# Patient Record
Sex: FEMALE | Race: WHITE | Employment: FULL TIME | URBAN - NONMETROPOLITAN AREA
[De-identification: names, ages, dates, MRNs, and addresses within clinical notes are randomized per-mention and may not be internally consistent; named-entity substitution may affect disease eponyms.]

---

## 2020-08-25 VITALS — HEIGHT: 65 IN

## 2020-08-25 PROBLEM — Z80.41 FAMILY HISTORY OF OVARIAN CANCER: Status: ACTIVE | Noted: 2020-08-25

## 2020-08-25 PROBLEM — N85.01 ENDOMETRIAL HYPERPLASIA, SIMPLE: Status: ACTIVE | Noted: 2020-08-25

## 2020-08-25 SDOH — HEALTH STABILITY: MENTAL HEALTH: HOW OFTEN DO YOU HAVE A DRINK CONTAINING ALCOHOL?: NEVER

## 2020-09-09 ENCOUNTER — OFFICE VISIT (OUTPATIENT)
Dept: OBGYN CLINIC | Age: 45
End: 2020-09-09
Payer: COMMERCIAL

## 2020-09-09 ENCOUNTER — HOSPITAL ENCOUNTER (OUTPATIENT)
Age: 45
Setting detail: SPECIMEN
Discharge: HOME | End: 2020-09-09
Payer: COMMERCIAL

## 2020-09-09 VITALS
SYSTOLIC BLOOD PRESSURE: 131 MMHG | DIASTOLIC BLOOD PRESSURE: 94 MMHG | BODY MASS INDEX: 48.82 KG/M2 | HEIGHT: 65 IN | WEIGHT: 293 LBS

## 2020-09-09 LAB
ESTIMATED AVERAGE GLUCOSE: 217 MG/DL
HBA1C MFR BLD: 9.2 % (ref 4–6)
TSH SERPL DL<=0.05 MIU/L-ACNC: 2.16 MIU/L (ref 0.3–5)

## 2020-09-09 PROCEDURE — 99396 PREV VISIT EST AGE 40-64: CPT | Performed by: NURSE PRACTITIONER

## 2020-09-09 RX ORDER — MONTELUKAST SODIUM 10 MG/1
TABLET ORAL
COMMUNITY
Start: 2019-12-02

## 2020-09-09 RX ORDER — LISINOPRIL 2.5 MG/1
TABLET ORAL
COMMUNITY
Start: 2020-08-23 | End: 2022-01-03 | Stop reason: ALTCHOICE

## 2020-09-09 NOTE — PROGRESS NOTES
YEARLY PHYSICAL    Date of service: 2020    Radha Mahajan  Is a 54 y.o.   female    PT's PCP is: Yasmin Fleming MD     : 1975                                             Subjective:       Patient's last menstrual period was 2020 (within days). Are your menses regular: no    OB History    Para Term  AB Living   0 0 0 0 0 0   SAB TAB Ectopic Molar Multiple Live Births   0 0 0 0 0 0        Social History     Tobacco Use   Smoking Status Never Smoker   Smokeless Tobacco Never Used        Social History     Substance and Sexual Activity   Alcohol Use Never    Frequency: Never       Family History   Problem Relation Age of Onset    Diabetes Father     Heart Disease Father     Cancer Sister 35        ovarian       Any family history of breast or ovarian cancer:  Yes    Any family history of blood clots: No      Allergies: Statins      Current Outpatient Medications:     progesterone (PROMETRIUM) 200 MG capsule, Take 1 capsule by mouth nightly As directed, Disp: 30 capsule, Rfl: 6    linagliptin (TRADJENTA) 5 MG tablet, TAKE ONE TABLET BY MOUTH DAILY, Disp: , Rfl:     lisinopril (PRINIVIL;ZESTRIL) 2.5 MG tablet, , Disp: , Rfl:     metFORMIN (GLUCOPHAGE) 1000 MG tablet, , Disp: , Rfl:     montelukast (SINGULAIR) 10 MG tablet, TAKE ONE TABLET BY MOUTH DAILY, Disp: , Rfl:     Ferrous Gluconate (IRON 27 PO), Take by mouth, Disp: , Rfl:     DAILY MULTIPLE VITAMINS/IRON PO, Take by mouth, Disp: , Rfl:     Omeprazole (PRILOSEC PO), Take by mouth, Disp: , Rfl:     Social History     Substance and Sexual Activity   Sexual Activity Yes    Partners: Male       Any bleeding or pain with intercourse: No    Last Yearly: 2019    Last pap: 2018    Last HPV: 2018    Last Mammogram: 2018      Past Medical History:   Diagnosis Date    Diabetes mellitus ([facility] [address]     Endometrial hyperplasia, simple     Hypertension     Infertility, female     Insulin resistance        Past Surgical History:   Procedure Laterality Date    DILATION AND CURETTAGE  2006    HYSTEROSCOPY  2006    KNEE SURGERY Right 1993    TONSILLECTOMY  1979       Family History   Problem Relation Age of Onset    Diabetes Father     Heart Disease Father     Cancer Sister 35        ovarian       Chief Complaint   Patient presents with    Gynecologic Exam     Not due for pap. Refill progesterone. Feeling well, voices no concerns. Due for mammogram.          PE:  Vital Signs  Blood pressure (!) 131/94, height 5' 5\" (1.651 m), weight (!) 312 lb 3.2 oz (141.6 kg), last menstrual period 06/29/2020, not currently breastfeeding. Labs:    No results found for this visit on 09/09/20. HPI: Patient here for annual exam. Feeling well voices no concerns. Denies breast/pelvic pain. Patient continues to take Prometrium cyclically. Cycles have been irregular over last year. Having menses every 3 months. Reports last Hgb A1c was drawn about a year ago and was  7.6. Review of Systems   Genitourinary: Positive for menstrual problem (every 3 months for last year ). All other systems reviewed and are negative. Objective  Lymphatic:   no lymphadenopathy  Heent:   negative   Cor: regular rate and rhythm, no murmurs              Pul:clear to auscultation bilaterally- no wheezes, rales or rhonchi, normal air movement, no respiratory distress      GI: Abdomen soft, non-tender. BS normal. No masses,  No organomegaly           Extremities: normal strength, tone, and muscle mass, ROM of all joints is normal   Breasts: Breast:normal appearance, no masses or tenderness, No nipple retraction or dimpling, No nipple discharge or bleeding   Pelvic Exam: GENITAL/URINARY:  External Genitalia:  General appearance; normal, Hair distribution; normal, Lesions absent  Urethral Meatus:  Size normal, Location normal, Lesions absent, Prolapse absent  Urethra:   Fullness absent, Masses absent  Bladder:  Fullness absent, Masses absent, Tenderness absent, Cystocele absent  Vagina:  General appearance normal, Estrogen effect normal, Discharge absent, Lesions absent, Pelvic support normal  Cervix:  General appearance normal, Lesions absent, Discharge absent, Tenderness absent, Enlargement absent, Nodularity absent  Uterus:  Size normal, Tenderness absent  Adenexa: Masses absent, Tenderness absent  Anus/Perineum:  Lesions absent and Masses absent                                    Vaginal discharge: no vaginal discharge                            Assessment and Plan          Diagnosis Orders   1. Encounter for screening mammogram for malignant neoplasm of breast  TIAGO DIGITAL SCREEN W OR WO CAD BILATERAL   2. Women's annual routine gynecological examination  TSH With Reflex Ft4    Hemoglobin A1C   3. Endometrial hyperplasia, unspecified  progesterone (PROMETRIUM) 200 MG capsule             I have discontinued Radha Mahajan's Montelukast Sodium (SINGULAIR PO), linaGLIPtin (TRADJENTA PO), Progesterone Micronized (PROMETRIUM PO), LISINOPRIL PO, and METFORMIN HCL PO. I am also having her start on progesterone. Additionally, I am having her maintain her Ferrous Gluconate (IRON 27 PO), DAILY MULTIPLE VITAMINS/IRON PO, Omeprazole (PRILOSEC PO), linagliptin, lisinopril, metFORMIN, and montelukast.    Return in about 1 week (around 9/16/2020) for gyn US w/fu, hx of endometrial hyperplasia, AUB. She was also counseled on her preventative health maintenance recommendations and follow-up. There are no Patient Instructions on file for this visit.     Lorena Colbert,9/13/2020 6:28 PM

## 2020-10-05 ENCOUNTER — OFFICE VISIT (OUTPATIENT)
Dept: OBGYN CLINIC | Age: 45
End: 2020-10-05
Payer: COMMERCIAL

## 2020-10-05 VITALS — WEIGHT: 293 LBS | SYSTOLIC BLOOD PRESSURE: 130 MMHG | BODY MASS INDEX: 51.29 KG/M2 | DIASTOLIC BLOOD PRESSURE: 92 MMHG

## 2020-10-05 PROCEDURE — 99213 OFFICE O/P EST LOW 20 MIN: CPT | Performed by: NURSE PRACTITIONER

## 2020-10-05 NOTE — PROGRESS NOTES
PROBLEM VISIT     Date of service: 10/5/2020    Radha Mahajan  Is a 54 y.o. female    PT's PCP is: Tu Abernathy MD     : 1975                                             Subjective:       Patient's last menstrual period was 2020. OB History    Para Term  AB Living   0 0 0 0 0 0   SAB TAB Ectopic Molar Multiple Live Births   0 0 0 0 0 0        Social History     Tobacco Use   Smoking Status Never Smoker   Smokeless Tobacco Never Used        Social History     Substance and Sexual Activity   Alcohol Use Never    Frequency: Never       Allergies: Statins      Current Outpatient Medications:     progesterone (PROMETRIUM) 200 MG capsule, Take 1 capsule by mouth nightly As directed, Disp: 30 capsule, Rfl: 6    linagliptin (TRADJENTA) 5 MG tablet, TAKE ONE TABLET BY MOUTH DAILY, Disp: , Rfl:     lisinopril (PRINIVIL;ZESTRIL) 2.5 MG tablet, , Disp: , Rfl:     metFORMIN (GLUCOPHAGE) 1000 MG tablet, , Disp: , Rfl:     montelukast (SINGULAIR) 10 MG tablet, TAKE ONE TABLET BY MOUTH DAILY, Disp: , Rfl:     Ferrous Gluconate (IRON 27 PO), Take by mouth, Disp: , Rfl:     DAILY MULTIPLE VITAMINS/IRON PO, Take by mouth, Disp: , Rfl:     Omeprazole (PRILOSEC PO), Take by mouth, Disp: , Rfl:     Social History     Substance and Sexual Activity   Sexual Activity Yes    Partners: Male       Chief Complaint   Patient presents with    Follow-up     Follow up usn for irregular menses. PE:  Vital Signs  Blood pressure (!) 130/92, weight (!) 308 lb 3.2 oz (139.8 kg), last menstrual period 2020, not currently breastfeeding. HPI: Patient presents today following gyn usn. Patient has a history of simple hyperplasia in 2006. Has been progesterone cyclically since. Reports menses q3 months for the past year. PT denies fever, chills, nausea and vomiting       Review of Systems   Constitutional: Negative. Genitourinary: Positive for menstrual problem.        Physical Exam  Constitutional:       Appearance: Normal appearance. HENT:      Head: Normocephalic. Pulmonary:      Effort: Pulmonary effort is normal.   Musculoskeletal: Normal range of motion. Neurological:      Mental Status: She is alert and oriented to person, place, and time. Psychiatric:         Behavior: Behavior normal.         Assessment and Plan          Diagnosis Orders   1. Endometrial hyperplasia, unspecified         Labs and usn reviewed with patient. Hgb A1C significantly elevated (10.2). Patient has started dieting and exercising, reports loss of 3 pounds. Has appt to follow up with PCP who is managing diabetes. USN revealed thin endometrial lining 3.4mm. But due to personal history and multiple risk factors would encourage further evaluation of endometrial lining due to cycle changes. I am having Geanie Mealing. Boroff maintain her Ferrous Gluconate (IRON 27 PO), DAILY MULTIPLE VITAMINS/IRON PO, Omeprazole (PRILOSEC PO), linagliptin, lisinopril, metFORMIN, montelukast, and progesterone. No follow-ups on file. There are no Patient Instructions on file for this visit. Over 50% of time spent on counseling and care coordination on: see assessment and plan,  She was also counseled on her preventative health maintenance recommendations and follow-up.         FF time: 15 min      Dennis Colbert,10/9/2020 10:14 AM

## 2020-10-08 ENCOUNTER — TELEPHONE (OUTPATIENT)
Dept: OBGYN CLINIC | Age: 45
End: 2020-10-08

## 2020-10-08 NOTE — TELEPHONE ENCOUNTER
Please call patient and notify her that I discussed case with Dr. Shila Melton and endometrial biopsy would be recommended at this time due to history of simple hyperplasia.  Thanks

## 2020-10-08 NOTE — TELEPHONE ENCOUNTER
Pt aware of recommendation but states it is very painful for her and says she will need something to take before appt to help relax her or she won;t be able to do the procedure in the office. We did not schedule endo bx yet, shewanted an answer on meds before we schedule.

## 2020-11-04 RX ORDER — ALPRAZOLAM 0.5 MG/1
0.5 TABLET ORAL ONCE
Qty: 1 TABLET | Refills: 0 | Status: SHIPPED | OUTPATIENT
Start: 2020-11-04 | End: 2020-11-04

## 2020-11-04 NOTE — TELEPHONE ENCOUNTER
Spoke to patient, she wanted to go ahead and schedule her endometrial biopsy. She is aware that Genn[facility] gave the ok to send in a Rx for Xanax. Rx called to [facility] on [address] [employer] [facility] and appt scheduled. Patient verbalized understanding, no further questions/concerns voiced.

## 2020-11-24 ENCOUNTER — HOSPITAL ENCOUNTER (OUTPATIENT)
Age: 45
Setting detail: SPECIMEN
Discharge: HOME | End: 2020-11-24
Payer: COMMERCIAL

## 2020-11-24 ENCOUNTER — PROCEDURE VISIT (OUTPATIENT)
Dept: OBGYN CLINIC | Age: 45
End: 2020-11-24
Payer: COMMERCIAL

## 2020-11-24 VITALS — DIASTOLIC BLOOD PRESSURE: 86 MMHG | SYSTOLIC BLOOD PRESSURE: 140 MMHG | WEIGHT: 293 LBS | BODY MASS INDEX: 51.42 KG/M2

## 2020-11-24 LAB
CONTROL: NORMAL
ESTRADIOL LEVEL: 40 PG/ML (ref 27–314)
FOLLICLE STIMULATING HORMONE: 11.9 U/L (ref 1.7–21.5)
PREGNANCY TEST URINE, POC: NEGATIVE

## 2020-11-24 PROCEDURE — 58100 BIOPSY OF UTERUS LINING: CPT | Performed by: NURSE PRACTITIONER

## 2020-11-24 PROCEDURE — 81025 URINE PREGNANCY TEST: CPT | Performed by: NURSE PRACTITIONER

## 2020-11-24 NOTE — PROGRESS NOTES
Radha Mahajan, A 54y.o. year old female presents to the office for an Endometrial Biopsy. BP (!) 140/86   Wt (!) 309 lb (140.2 kg)   BMI 51.42 kg/m²     Reason For Biopsy: hx of simple hyperplasia, irregular menses. EMB: performed today - procedure explained to pt - verbal consent obtained     Patient did not have serum hcg drawn prior, but was agreeable to urine pregnancy test today. Negative results. Procedure:      A speculum was paced in vaginal canal and the cervix was visualized. The cervix was then prepped with betadine x 3. A tenaculum was used. The uterus was measured to be 7 cm. Pipette used to obtain specimen and the specimen will be sent for pathology. A small amount of tissue was collected. Pt tolerated the procedure well. Assessment:    Patient with history of simple hyperplasia in 2006. Was having monthly menses until past year where they started occuring every 3 months. No menses since 6/29/2020. Discussed labs to evaluate perimenopause. Hgb A1C significantly elevated (9.2), which could potentially be attributing to amenorrhea state also. Case reviewed with Dr. Georgiana Krishnamurthy. Plan:   1. Warning signs reviewed such as cramping and spotting. 2. Encouraged to use ibuprofen 800 mg TID PRN and heat for comfort. 3. Will call patient with biopsy results.    4. Anticipate follow up with: potential adjustment of treatment plan pending usn results

## 2020-11-27 LAB — SURGICAL PATHOLOGY REPORT: NORMAL

## 2020-12-01 NOTE — RESULT ENCOUNTER NOTE
Normal labs and endo bx sample was negative for atypia or malignancy. Continue with prometrium cyclically.

## 2021-09-13 ENCOUNTER — HOSPITAL ENCOUNTER (OUTPATIENT)
Age: 46
Setting detail: SPECIMEN
Discharge: HOME | End: 2021-09-13
Payer: COMMERCIAL

## 2021-09-13 ENCOUNTER — OFFICE VISIT (OUTPATIENT)
Dept: OBGYN CLINIC | Age: 46
End: 2021-09-13
Payer: COMMERCIAL

## 2021-09-13 VITALS — SYSTOLIC BLOOD PRESSURE: 130 MMHG | WEIGHT: 293 LBS | BODY MASS INDEX: 49.36 KG/M2 | DIASTOLIC BLOOD PRESSURE: 84 MMHG

## 2021-09-13 DIAGNOSIS — Z01.419 WOMEN'S ANNUAL ROUTINE GYNECOLOGICAL EXAMINATION: Primary | ICD-10-CM

## 2021-09-13 DIAGNOSIS — Z87.42 HISTORY OF ENDOMETRIAL HYPERPLASIA: ICD-10-CM

## 2021-09-13 PROCEDURE — 99396 PREV VISIT EST AGE 40-64: CPT | Performed by: NURSE PRACTITIONER

## 2021-09-13 RX ORDER — CHOLECALCIFEROL (VITAMIN D3) 1250 MCG
CAPSULE ORAL
COMMUNITY
Start: 2021-06-29

## 2021-09-13 RX ORDER — EZETIMIBE 10 MG/1
TABLET ORAL
COMMUNITY
Start: 2021-08-30

## 2021-09-13 RX ORDER — DAPAGLIFLOZIN 5 MG/1
TABLET, FILM COATED ORAL
COMMUNITY
Start: 2021-08-31

## 2021-09-13 RX ORDER — PROGESTERONE 200 MG/1
200 CAPSULE ORAL NIGHTLY
Qty: 30 CAPSULE | Refills: 6 | Status: SHIPPED | OUTPATIENT
Start: 2021-09-13 | End: 2022-01-03 | Stop reason: ALTCHOICE

## 2021-09-13 NOTE — PROGRESS NOTES
YEARLY PHYSICAL    Date of service: 2021    Radha Mahajan  Is a 51 y.o.  female    PT's PCP is: Tu Abernathy MD     : 1975                                             Subjective:       Patient's last menstrual period was 06/15/2021. Are your menses regular: no    OB History    Para Term  AB Living   0 0 0 0 0 0   SAB TAB Ectopic Molar Multiple Live Births   0 0 0 0 0 0        Social History     Tobacco Use   Smoking Status Never Smoker   Smokeless Tobacco Never Used        Social History     Substance and Sexual Activity   Alcohol Use Never       Family History   Problem Relation Age of Onset    Diabetes Father     Heart Disease Father     Cancer Sister 35        ovarian       Any family history of breast or ovarian cancer:  Yes    Any family history of blood clots: No    Allergies: Statins      Current Outpatient Medications:     ezetimibe (ZETIA) 10 MG tablet, , Disp: , Rfl:     FARXIGA 5 MG tablet, , Disp: , Rfl:     Cholecalciferol (VITAMIN D3) 1.25 MG (58193 UT) CAPS, , Disp: , Rfl:     SITagliptin (JANUVIA) 100 MG tablet, Take 100 mg by mouth daily, Disp: , Rfl:     progesterone (PROMETRIUM) 200 MG CAPS capsule, Take 1 capsule by mouth nightly, Disp: 30 capsule, Rfl: 6    progesterone (PROMETRIUM) 200 MG capsule, Take 1 capsule by mouth nightly As directed, Disp: 30 capsule, Rfl: 6    lisinopril (PRINIVIL;ZESTRIL) 2.5 MG tablet, , Disp: , Rfl:     metFORMIN (GLUCOPHAGE) 1000 MG tablet, , Disp: , Rfl:     montelukast (SINGULAIR) 10 MG tablet, TAKE ONE TABLET BY MOUTH DAILY, Disp: , Rfl:     Ferrous Gluconate (IRON 27 PO), Take by mouth, Disp: , Rfl:     DAILY MULTIPLE VITAMINS/IRON PO, Take by mouth, Disp: , Rfl:     Omeprazole (PRILOSEC PO), Take by mouth, Disp: , Rfl:     Social History     Substance and Sexual Activity   Sexual Activity Yes    Partners: Male       Any bleeding or pain with intercourse: No    Last Yearly:  9/2020    Last pap: 7/2018    Last HPV: 7/2018    Last Mammogram: 6/2021    Last Dexascan n/a    Last colorectal screen- 2016    Do you do self breast exams: encouraged monthly SBE    Past Medical History:   Diagnosis Date    Diabetes mellitus ([address]     Endometrial hyperplasia, simple     Hypertension     Infertility, female     Insulin resistance        Past Surgical History:   Procedure Laterality Date    DILATION AND CURETTAGE  2006    HYSTEROSCOPY  2006    KNEE SURGERY Right 1993    TONSILLECTOMY  1979       Family History   Problem Relation Age of Onset    Diabetes Father     Heart Disease Father     Cancer Sister 35        ovarian       Chief Complaint   Patient presents with    Gynecologic Exam     Last pap 7/10/18. Mammogram done 6/2021. Refill prometrium     Menstrual Problem     Reports menses becoming more spaced out. PE:  Vital Signs  Blood pressure 130/84, weight 296 lb 9.6 oz (134.5 kg), last menstrual period 06/15/2021, not currently breastfeeding. Estimated body mass index is 49.36 kg/m² as calculated from the following:    Height as of 9/9/20: 5' 5\" (1.651 m). Weight as of this encounter: 296 lb 9.6 oz (134.5 kg). HPI: Patient presents today for annual exam. Denies breast/pelvic pain. Due for pap. Mammogram and wellness UTD. Recent hgb A1C in the 8's. Menses continue to be irregular. States she will have menses monthly and then skip several months. Continues to take Prometrium 749IP cyclically for 10 nights. Review of Systems   Genitourinary: Positive for menstrual problem. All other systems reviewed and are negative. Objective  Heent:   negative   Cor: regular rate and rhythm, no murmurs              Pul:clear to auscultation bilaterally- no wheezes, rales or rhonchi, normal air movement, no respiratory distress      GI: Abdomen soft, non-tender.  BS normal. No masses,  No organomegaly           Extremities: normal strength, tone, and muscle mass, ROM of all joints is normal   Breasts: Breast:normal appearance, no masses or tenderness, No nipple retraction or dimpling, No nipple discharge or bleeding   Pelvic Exam: GENITAL/URINARY:  External Genitalia:  General appearance; normal, Hair distribution; normal, Lesions absent  Urethral Meatus:  Size normal, Location normal, Lesions absent, Prolapse absent  Urethra: Fullness absent, Masses absent  Bladder:  Fullness absent, Masses absent, Tenderness absent, Cystocele absent  Vagina:  General appearance normal, Estrogen effect normal, Discharge absent, Lesions absent, Pelvic support normal  Cervix:  General appearance normal, Lesions absent, Discharge absent, Tenderness absent, Enlargement absent, Nodularity absent  Uterus:  Size normal, Tenderness absent  Adenexa: Masses absent, Tenderness absent, difficult to assess due to body habitus   Anus/Perineum:  Lesions absent and Masses absent                                    Vaginal discharge: no vaginal discharge   Chaperone: not present                      Assessment and Plan          Diagnosis Orders   1. Women's annual routine gynecological examination     2. History of endometrial hyperplasia  progesterone (PROMETRIUM) 200 MG CAPS capsule       will plan for ultrasound to assess endometrium, discussed possible need for bx. Patient considering [facility] for definitive management    I have discontinued Radha Mahajan's linagliptin. I am also having her start on progesterone. Additionally, I am having her maintain her Ferrous Gluconate (IRON 27 PO), DAILY MULTIPLE VITAMINS/IRON PO, Omeprazole (PRILOSEC PO), lisinopril, metFORMIN, montelukast, progesterone, ezetimibe, Farxiga, Vitamin D3, and SITagliptin. Return for gyn US, irregular menses, hx of hyperplasia . She was also counseled on her preventative health maintenance recommendations and follow-up. There are no Patient Instructions on file for this visit.     JING Nuno - NP,9/13/2021 10:43 AM

## 2021-09-16 LAB
HPV SAMPLE: NORMAL
HPV, GENOTYPE 16: NOT DETECTED
HPV, GENOTYPE 18: NOT DETECTED
HPV, HIGH RISK OTHER: NOT DETECTED
HPV, INTERPRETATION: NORMAL
SPECIMEN DESCRIPTION: NORMAL

## 2021-09-17 LAB — CYTOLOGY REPORT: NORMAL

## 2021-09-27 ENCOUNTER — TELEPHONE (OUTPATIENT)
Dept: OBGYN CLINIC | Age: 46
End: 2021-09-27

## 2021-09-27 ENCOUNTER — OFFICE VISIT (OUTPATIENT)
Dept: OBGYN CLINIC | Age: 46
End: 2021-09-27
Payer: COMMERCIAL

## 2021-09-27 VITALS — BODY MASS INDEX: 48.51 KG/M2 | SYSTOLIC BLOOD PRESSURE: 125 MMHG | WEIGHT: 291.5 LBS | DIASTOLIC BLOOD PRESSURE: 86 MMHG

## 2021-09-27 DIAGNOSIS — N92.6 IRREGULAR MENSES: ICD-10-CM

## 2021-09-27 DIAGNOSIS — N83.202 LEFT OVARIAN CYST: ICD-10-CM

## 2021-09-27 DIAGNOSIS — Z87.42 HISTORY OF ENDOMETRIAL HYPERPLASIA: Primary | ICD-10-CM

## 2021-09-27 PROCEDURE — G8417 CALC BMI ABV UP PARAM F/U: HCPCS | Performed by: NURSE PRACTITIONER

## 2021-09-27 PROCEDURE — G8427 DOCREV CUR MEDS BY ELIG CLIN: HCPCS | Performed by: NURSE PRACTITIONER

## 2021-09-27 PROCEDURE — 99213 OFFICE O/P EST LOW 20 MIN: CPT | Performed by: NURSE PRACTITIONER

## 2021-09-27 PROCEDURE — 1036F TOBACCO NON-USER: CPT | Performed by: NURSE PRACTITIONER

## 2021-09-27 RX ORDER — OMEPRAZOLE 20 MG/1
CAPSULE, DELAYED RELEASE ORAL
COMMUNITY
Start: 2021-09-17

## 2021-09-27 ASSESSMENT — ENCOUNTER SYMPTOMS
GASTROINTESTINAL NEGATIVE: 1
RESPIRATORY NEGATIVE: 1

## 2021-09-27 NOTE — PROGRESS NOTES
PROBLEM VISIT     Date of service: 2021    Radha Mahajan  Is a 51 y.o. female    PT's PCP is: Matilda Lewis MD     : 1975                                             Subjective:       No LMP recorded. (Menstrual status: Irregular periods). OB History    Para Term  AB Living   0 0 0 0 0 0   SAB TAB Ectopic Molar Multiple Live Births   0 0 0 0 0 0        Social History     Tobacco Use   Smoking Status Never Smoker   Smokeless Tobacco Never Used        Social History     Substance and Sexual Activity   Alcohol Use Never       Allergies: Statins      Current Outpatient Medications:     omeprazole (PRILOSEC) 20 MG delayed release capsule, , Disp: , Rfl:     ezetimibe (ZETIA) 10 MG tablet, , Disp: , Rfl:     FARXIGA 5 MG tablet, , Disp: , Rfl:     Cholecalciferol (VITAMIN D3) 1.25 MG (23670 UT) CAPS, , Disp: , Rfl:     SITagliptin (JANUVIA) 100 MG tablet, Take 100 mg by mouth daily, Disp: , Rfl:     progesterone (PROMETRIUM) 200 MG CAPS capsule, Take 1 capsule by mouth nightly, Disp: 30 capsule, Rfl: 6    progesterone (PROMETRIUM) 200 MG capsule, Take 1 capsule by mouth nightly As directed, Disp: 30 capsule, Rfl: 6    lisinopril (PRINIVIL;ZESTRIL) 2.5 MG tablet, , Disp: , Rfl:     metFORMIN (GLUCOPHAGE) 1000 MG tablet, , Disp: , Rfl:     montelukast (SINGULAIR) 10 MG tablet, TAKE ONE TABLET BY MOUTH DAILY, Disp: , Rfl:     Ferrous Gluconate (IRON 27 PO), Take by mouth, Disp: , Rfl:     DAILY MULTIPLE VITAMINS/IRON PO, Take by mouth, Disp: , Rfl:     Social History     Substance and Sexual Activity   Sexual Activity Yes    Partners: Male     Chief Complaint   Patient presents with    Follow-up     Follow up usn for irregular menses and h/o endometrial hyperplasia. PE:  Vital Signs  Blood pressure 125/86, weight 291 lb 8 oz (132.2 kg), not currently breastfeeding.      HPI: Patient presents today following ultrasound for irregular menses and personal hx of endometrial hyperplasia. Desires [facility] for definitive treatment. PT denies fever, chills, nausea and vomiting       Review of Systems   Constitutional: Negative. Respiratory: Negative. Cardiovascular: Negative. Gastrointestinal: Negative. Genitourinary: Positive for menstrual problem (irregular menses ). Negative for dysuria, frequency, pelvic pain, vaginal bleeding and vaginal discharge. Neurological: Negative. Physical Exam  Constitutional:       Appearance: Normal appearance. She is obese. HENT:      Head: Normocephalic. Pulmonary:      Effort: Pulmonary effort is normal.   Musculoskeletal:         General: Normal range of motion. Neurological:      General: No focal deficit present. Mental Status: She is alert. Psychiatric:         Mood and Affect: Mood normal.         Behavior: Behavior normal.     Heterogeneous appearing uterus measures: 6.9 x 4.7  x 3.9 cm  Endometrium measures: 6.5 mm  Right ovary appears WNL  Left ovary contains a cyst with low-level echoes and a fluid-fluid level measuring: 2.2 x 1.7 x 1.8 cm    Assessment and Plan          Diagnosis Orders   1. History of endometrial hyperplasia     2. Irregular menses     3. Left ovarian cyst       Patient will take Prometrium daily until [facility] for definitive management- hx simple hyperplasia. Negative endo bx 11/2020  Repeat imaging to assure resolution of left ovarian cyst due age and fam hx        I am having Radha Mahajan maintain her Ferrous Gluconate (IRON 27 PO), DAILY MULTIPLE VITAMINS/IRON PO, lisinopril, metFORMIN, montelukast, progesterone, ezetimibe, Farxiga, Vitamin D3, SITagliptin, progesterone, and omeprazole. Return in about 3 months (around 12/27/2021) for gyn US recheck left ovarian cyst .    There are no Patient Instructions on file for this visit.     Over 50% of time spent on counseling and care coordination on: see assessment and plan,  She was also counseled on her preventative health maintenance recommendations and follow-up.         FF time: 20 min      Marlyse Scheuermann, APRN - NP,9/27/2021 11:56 AM

## 2022-01-03 ENCOUNTER — TELEPHONE (OUTPATIENT)
Dept: OBGYN CLINIC | Age: 47
End: 2022-01-03

## 2022-01-03 ENCOUNTER — OFFICE VISIT (OUTPATIENT)
Dept: OBGYN CLINIC | Age: 47
End: 2022-01-03
Payer: COMMERCIAL

## 2022-01-03 VITALS
DIASTOLIC BLOOD PRESSURE: 74 MMHG | WEIGHT: 282.8 LBS | BODY MASS INDEX: 47.12 KG/M2 | HEIGHT: 65 IN | SYSTOLIC BLOOD PRESSURE: 120 MMHG

## 2022-01-03 DIAGNOSIS — N83.202 UNSPECIFIED OVARIAN CYST, LEFT SIDE: Primary | ICD-10-CM

## 2022-01-03 DIAGNOSIS — Z87.42 HISTORY OF ENDOMETRIAL HYPERPLASIA: ICD-10-CM

## 2022-01-03 PROCEDURE — G8484 FLU IMMUNIZE NO ADMIN: HCPCS | Performed by: NURSE PRACTITIONER

## 2022-01-03 PROCEDURE — 99213 OFFICE O/P EST LOW 20 MIN: CPT | Performed by: NURSE PRACTITIONER

## 2022-01-03 PROCEDURE — G8427 DOCREV CUR MEDS BY ELIG CLIN: HCPCS | Performed by: NURSE PRACTITIONER

## 2022-01-03 PROCEDURE — 1036F TOBACCO NON-USER: CPT | Performed by: NURSE PRACTITIONER

## 2022-01-03 PROCEDURE — G8417 CALC BMI ABV UP PARAM F/U: HCPCS | Performed by: NURSE PRACTITIONER

## 2022-01-03 RX ORDER — ALBUTEROL SULFATE 90 UG/1
AEROSOL, METERED RESPIRATORY (INHALATION)
COMMUNITY
Start: 2021-11-27

## 2022-01-03 NOTE — TELEPHONE ENCOUNTER
Patient desires [facility] for definitive management of simple endometrial hyperplasia. Would like to keep one ovary if able. Has been taking Prometrium for years. Neg endo bx 11/24/2020. Since not urgent would like to plan for [facility] in April. .   She is currently on 1L of O2 s/p Covid infection.

## 2022-01-03 NOTE — PROGRESS NOTES
following ultrasound to recheck ovarian cyst. Denies any concerns. Had Covid early November; was hospitalized x9 days and continues with oxygen via nasal canula. Reports she has not taken Prometrium in some time; states she forgot with everything going on. PT denies fever, chills, nausea and vomiting       Review of Systems   Constitutional: Negative. Genitourinary: Negative. Physical Exam  Constitutional:       Appearance: Normal appearance. HENT:      Head: Normocephalic. Pulmonary:      Effort: Pulmonary effort is normal.      Comments: Patient on nasal canula O2 s/p Covid infection   Musculoskeletal:         General: Normal range of motion. Neurological:      General: No focal deficit present. Mental Status: She is alert. Psychiatric:         Mood and Affect: Mood normal.         Behavior: Behavior normal.       Assessment and Plan          Diagnosis Orders   1. Unspecified ovarian cyst, left side     2. History of endometrial hyperplasia       Reviewed importance of Prometrium, patient plans to restart. usn reviewed- patient desires to proceed with [facility] for definitive management of hyperplasia         I have discontinued Radha Mahajan's lisinopril. I am also having her maintain her Ferrous Gluconate (IRON 27 PO), DAILY MULTIPLE VITAMINS/IRON PO, metFORMIN, montelukast, progesterone, ezetimibe, Farxiga, Vitamin D3, SITagliptin, omeprazole, albuterol sulfate HFA, and ascorbic acid. No follow-ups on file. There are no Patient Instructions on file for this visit. Over 50% of time spent on counseling and care coordination on: see assessment and plan,  She was also counseled on her preventative health maintenance recommendations and follow-up.         FF time: 20 min      JING Sanches NP,1/3/2022 9:21 AM

## 2022-01-27 DIAGNOSIS — N85.01 ENDOMETRIAL HYPERPLASIA, SIMPLE: Primary | ICD-10-CM

## 2022-01-27 DIAGNOSIS — Z87.42 HISTORY OF ENDOMETRIAL HYPERPLASIA: ICD-10-CM

## 2022-01-27 DIAGNOSIS — E11.9 CONTROLLED TYPE 2 DIABETES MELLITUS WITHOUT COMPLICATION, WITHOUT LONG-TERM CURRENT USE OF INSULIN (HCC): ICD-10-CM

## 2022-01-27 DIAGNOSIS — Z80.41 FAMILY HISTORY OF OVARIAN CANCER: ICD-10-CM

## 2022-01-27 DIAGNOSIS — Z01.818 PRE-OP TESTING: ICD-10-CM

## 2022-01-27 DIAGNOSIS — I10 ESSENTIAL HYPERTENSION, BENIGN: ICD-10-CM

## 2022-01-27 NOTE — TELEPHONE ENCOUNTER
Patient called back and wanted to go ahead with surgery on . She is scheduled for a RA YADIEL, vale CLARK, vale Yee at [facility] on 22. She is aware that a nurse from [facility] will call her to complete a phone interview and arrange COVID testing. She has been off oxygen for over 2 weeks at this time. 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 She will have FMLA papers sent to the office to be completed. Patient will come in to see Dr. Maximiliano Armas prior to surgery and will get labs at that visit. Patient states that she was taken off her HTN meds recently and her BP is normal without them. We did discuss that with her DM and HTN, she would need an EKG. Patient did have an EKG 2021 when she was being worked up with [facility]an. She states that her father  during surgery from an MI and would like to get another EKG closer to surgery as opposed to using the 2021 EKG. She will go to [facility] to have this done and plans to do it a week before surgery. We will also follow up 2 and 6 weeks after surgery. Appointments scheduled. Patient verbalized understanding, no further questions/concerns voiced.

## 2022-02-14 ENCOUNTER — TELEPHONE (OUTPATIENT)
Dept: OBGYN CLINIC | Age: 47
End: 2022-02-14

## 2022-02-14 NOTE — TELEPHONE ENCOUNTER
Spoke to GAY STARKS at [facility] regarding patient's upcoming procedure ([id number]). The procedure does not require a PA. Reference # is [id number].

## 2022-04-13 ENCOUNTER — OFFICE VISIT (OUTPATIENT)
Dept: OBGYN CLINIC | Age: 47
End: 2022-04-13
Payer: COMMERCIAL

## 2022-04-13 ENCOUNTER — HOSPITAL ENCOUNTER (OUTPATIENT)
Age: 47
Setting detail: SPECIMEN
Discharge: HOME | End: 2022-04-13

## 2022-04-13 VITALS — WEIGHT: 287 LBS | BODY MASS INDEX: 47.76 KG/M2 | DIASTOLIC BLOOD PRESSURE: 72 MMHG | SYSTOLIC BLOOD PRESSURE: 118 MMHG

## 2022-04-13 DIAGNOSIS — Z87.42 HISTORY OF ENDOMETRIAL HYPERPLASIA: ICD-10-CM

## 2022-04-13 DIAGNOSIS — E11.9 CONTROLLED TYPE 2 DIABETES MELLITUS WITHOUT COMPLICATION, WITHOUT LONG-TERM CURRENT USE OF INSULIN (HCC): ICD-10-CM

## 2022-04-13 DIAGNOSIS — Z01.818 PRE-OP TESTING: ICD-10-CM

## 2022-04-13 DIAGNOSIS — I10 ESSENTIAL HYPERTENSION, BENIGN: ICD-10-CM

## 2022-04-13 DIAGNOSIS — N85.01 ENDOMETRIAL HYPERPLASIA, SIMPLE: ICD-10-CM

## 2022-04-13 DIAGNOSIS — Z80.41 FAMILY HISTORY OF OVARIAN CANCER: ICD-10-CM

## 2022-04-13 DIAGNOSIS — N80.00 ENDOMETRIOSIS OF UTERUS: ICD-10-CM

## 2022-04-13 DIAGNOSIS — N92.6 IRREGULAR MENSES: Primary | ICD-10-CM

## 2022-04-13 LAB
ABSOLUTE EOS #: 0.22 K/UL (ref 0–0.44)
ABSOLUTE IMMATURE GRANULOCYTE: 0.07 K/UL (ref 0–0.3)
ABSOLUTE LYMPH #: 3.56 K/UL (ref 1.1–3.7)
ABSOLUTE MONO #: 0.7 K/UL (ref 0.1–1.2)
ALBUMIN SERPL-MCNC: 4.3 G/DL (ref 3.5–5.2)
ALBUMIN/GLOBULIN RATIO: 1.3 (ref 1–2.5)
ALP BLD-CCNC: 61 U/L (ref 35–104)
ALT SERPL-CCNC: 26 U/L (ref 5–33)
ANION GAP SERPL CALCULATED.3IONS-SCNC: 16 MMOL/L (ref 9–17)
AST SERPL-CCNC: 18 U/L
BASOPHILS # BLD: 1 % (ref 0–2)
BASOPHILS ABSOLUTE: 0.09 K/UL (ref 0–0.2)
BILIRUB SERPL-MCNC: 0.21 MG/DL (ref 0.3–1.2)
BUN BLDV-MCNC: 20 MG/DL (ref 6–20)
CALCIUM SERPL-MCNC: 10.1 MG/DL (ref 8.6–10.4)
CHLORIDE BLD-SCNC: 106 MMOL/L (ref 98–107)
CO2: 21 MMOL/L (ref 20–31)
CREAT SERPL-MCNC: 0.83 MG/DL (ref 0.5–0.9)
EOSINOPHILS RELATIVE PERCENT: 2 % (ref 1–4)
GFR AFRICAN AMERICAN: >60 ML/MIN
GFR NON-AFRICAN AMERICAN: >60 ML/MIN
GFR SERPL CREATININE-BSD FRML MDRD: ABNORMAL ML/MIN/{1.73_M2}
GLUCOSE BLD-MCNC: 230 MG/DL (ref 70–99)
HCG QUALITATIVE: NEGATIVE
HCT VFR BLD CALC: 43.9 % (ref 36.3–47.1)
HEMOGLOBIN: 14.3 G/DL (ref 11.9–15.1)
IMMATURE GRANULOCYTES: 1 %
LYMPHOCYTES # BLD: 29 % (ref 24–43)
MCH RBC QN AUTO: 28.5 PG (ref 25.2–33.5)
MCHC RBC AUTO-ENTMCNC: 32.6 G/DL (ref 28.4–34.8)
MCV RBC AUTO: 87.6 FL (ref 82.6–102.9)
MONOCYTES # BLD: 6 % (ref 3–12)
NRBC AUTOMATED: 0 PER 100 WBC
PDW BLD-RTO: 13 % (ref 11.8–14.4)
PLATELET # BLD: 317 K/UL (ref 138–453)
PMV BLD AUTO: 11.1 FL (ref 8.1–13.5)
POTASSIUM SERPL-SCNC: 4.4 MMOL/L (ref 3.7–5.3)
RBC # BLD: 5.01 M/UL (ref 3.95–5.11)
SEG NEUTROPHILS: 61 % (ref 36–65)
SEGMENTED NEUTROPHILS ABSOLUTE COUNT: 7.65 K/UL (ref 1.5–8.1)
SODIUM BLD-SCNC: 143 MMOL/L (ref 135–144)
TOTAL PROTEIN: 7.5 G/DL (ref 6.4–8.3)
WBC # BLD: 12.3 K/UL (ref 3.5–11.3)

## 2022-04-13 PROCEDURE — G8417 CALC BMI ABV UP PARAM F/U: HCPCS | Performed by: OBSTETRICS & GYNECOLOGY

## 2022-04-13 PROCEDURE — 99213 OFFICE O/P EST LOW 20 MIN: CPT | Performed by: OBSTETRICS & GYNECOLOGY

## 2022-04-13 PROCEDURE — 1036F TOBACCO NON-USER: CPT | Performed by: OBSTETRICS & GYNECOLOGY

## 2022-04-13 PROCEDURE — G8427 DOCREV CUR MEDS BY ELIG CLIN: HCPCS | Performed by: OBSTETRICS & GYNECOLOGY

## 2022-04-13 RX ORDER — FEXOFENADINE HCL 180 MG/1
180 TABLET ORAL DAILY
COMMUNITY

## 2022-04-13 RX ORDER — BUDESONIDE AND FORMOTEROL FUMARATE DIHYDRATE 160; 4.5 UG/1; UG/1
AEROSOL RESPIRATORY (INHALATION)
COMMUNITY
Start: 2022-03-21

## 2022-04-13 RX ORDER — AMOXICILLIN AND CLAVULANATE POTASSIUM 875; 125 MG/1; MG/1
TABLET, FILM COATED ORAL
COMMUNITY
Start: 2022-04-10 | End: 2022-06-02

## 2022-04-13 NOTE — PROGRESS NOTES
Patient instructed per phone interview on the pre-operative, intra-operative, and post-operative process as well as NPO status. Pre-operative instruction sheet reviewed with the patient as well as [facility] skin prep instructions. Patient to take Singulair,Symbacort, and Prilosec in am of surgery with sip of water only. Verbalizes understanding. Patient currently on antibiotic for ear infection, to complete medication 4/19/22. Verified with MARY Nicholson no impact on anesthesia.

## 2022-04-22 ENCOUNTER — HOSPITAL ENCOUNTER (OUTPATIENT)
Age: 47
Discharge: HOME | End: 2022-04-22
Payer: COMMERCIAL

## 2022-04-22 ENCOUNTER — ANESTHESIA EVENT (OUTPATIENT)
Dept: OPERATING ROOM | Age: 47
End: 2022-04-22
Payer: COMMERCIAL

## 2022-04-22 LAB
EKG ATRIAL RATE: 84 BPM
EKG P AXIS: 40 DEGREES
EKG P-R INTERVAL: 138 MS
EKG Q-T INTERVAL: 358 MS
EKG QRS DURATION: 70 MS
EKG QTC CALCULATION (BAZETT): 423 MS
EKG R AXIS: 12 DEGREES
EKG T AXIS: 25 DEGREES
EKG VENTRICULAR RATE: 84 BPM

## 2022-04-22 PROCEDURE — 93010 ELECTROCARDIOGRAM REPORT: CPT | Performed by: INTERNAL MEDICINE

## 2022-04-22 PROCEDURE — 93005 ELECTROCARDIOGRAM TRACING: CPT | Performed by: OBSTETRICS & GYNECOLOGY

## 2022-04-25 ENCOUNTER — HOSPITAL ENCOUNTER (OUTPATIENT)
Age: 47
Setting detail: OUTPATIENT SURGERY
Discharge: HOME | End: 2022-04-25
Attending: OBSTETRICS & GYNECOLOGY | Admitting: OBSTETRICS & GYNECOLOGY
Payer: COMMERCIAL

## 2022-04-25 ENCOUNTER — ANESTHESIA (OUTPATIENT)
Dept: OPERATING ROOM | Age: 47
End: 2022-04-25
Payer: COMMERCIAL

## 2022-04-25 VITALS
SYSTOLIC BLOOD PRESSURE: 113 MMHG | TEMPERATURE: 97.5 F | HEART RATE: 80 BPM | HEIGHT: 65 IN | OXYGEN SATURATION: 94 % | RESPIRATION RATE: 18 BRPM | WEIGHT: 286 LBS | BODY MASS INDEX: 47.65 KG/M2 | DIASTOLIC BLOOD PRESSURE: 62 MMHG

## 2022-04-25 VITALS — SYSTOLIC BLOOD PRESSURE: 148 MMHG | OXYGEN SATURATION: 93 % | DIASTOLIC BLOOD PRESSURE: 87 MMHG

## 2022-04-25 DIAGNOSIS — G89.18 POST-OP PAIN: Primary | ICD-10-CM

## 2022-04-25 LAB — HCG(URINE) PREGNANCY TEST: NEGATIVE

## 2022-04-25 PROCEDURE — 2580000003 HC RX 258: Performed by: NURSE ANESTHETIST, CERTIFIED REGISTERED

## 2022-04-25 PROCEDURE — 6360000002 HC RX W HCPCS

## 2022-04-25 PROCEDURE — 6360000002 HC RX W HCPCS: Performed by: NURSE ANESTHETIST, CERTIFIED REGISTERED

## 2022-04-25 PROCEDURE — 2709999900 HC NON-CHARGEABLE SUPPLY: Performed by: OBSTETRICS & GYNECOLOGY

## 2022-04-25 PROCEDURE — 7100000011 HC PHASE II RECOVERY - ADDTL 15 MIN: Performed by: OBSTETRICS & GYNECOLOGY

## 2022-04-25 PROCEDURE — 2500000003 HC RX 250 WO HCPCS: Performed by: NURSE ANESTHETIST, CERTIFIED REGISTERED

## 2022-04-25 PROCEDURE — 3700000001 HC ADD 15 MINUTES (ANESTHESIA): Performed by: OBSTETRICS & GYNECOLOGY

## 2022-04-25 PROCEDURE — 58571 TLH W/T/O 250 G OR LESS: CPT | Performed by: OBSTETRICS & GYNECOLOGY

## 2022-04-25 PROCEDURE — 7100000000 HC PACU RECOVERY - FIRST 15 MIN: Performed by: OBSTETRICS & GYNECOLOGY

## 2022-04-25 PROCEDURE — 7100000001 HC PACU RECOVERY - ADDTL 15 MIN: Performed by: OBSTETRICS & GYNECOLOGY

## 2022-04-25 PROCEDURE — 3700000000 HC ANESTHESIA ATTENDED CARE: Performed by: OBSTETRICS & GYNECOLOGY

## 2022-04-25 PROCEDURE — 7100000010 HC PHASE II RECOVERY - FIRST 15 MIN: Performed by: OBSTETRICS & GYNECOLOGY

## 2022-04-25 PROCEDURE — S2900 ROBOTIC SURGICAL SYSTEM: HCPCS | Performed by: OBSTETRICS & GYNECOLOGY

## 2022-04-25 PROCEDURE — 6370000000 HC RX 637 (ALT 250 FOR IP): Performed by: NURSE ANESTHETIST, CERTIFIED REGISTERED

## 2022-04-25 PROCEDURE — 3600000009 HC SURGERY ROBOT BASE: Performed by: OBSTETRICS & GYNECOLOGY

## 2022-04-25 PROCEDURE — 3600000019 HC SURGERY ROBOT ADDTL 15MIN: Performed by: OBSTETRICS & GYNECOLOGY

## 2022-04-25 PROCEDURE — 64488 TAP BLOCK BI INJECTION: CPT | Performed by: NURSE ANESTHETIST, CERTIFIED REGISTERED

## 2022-04-25 PROCEDURE — 58571 TLH W/T/O 250 G OR LESS: CPT

## 2022-04-25 PROCEDURE — 88307 TISSUE EXAM BY PATHOLOGIST: CPT

## 2022-04-25 PROCEDURE — 81025 URINE PREGNANCY TEST: CPT

## 2022-04-25 RX ORDER — OXYCODONE HYDROCHLORIDE 5 MG/1
5 TABLET ORAL PRN
Status: DISCONTINUED | OUTPATIENT
Start: 2022-04-25 | End: 2022-04-25 | Stop reason: HOSPADM

## 2022-04-25 RX ORDER — ROCURONIUM BROMIDE 10 MG/ML
INJECTION, SOLUTION INTRAVENOUS PRN
Status: DISCONTINUED | OUTPATIENT
Start: 2022-04-25 | End: 2022-04-25 | Stop reason: SDUPTHER

## 2022-04-25 RX ORDER — OXYCODONE HYDROCHLORIDE 5 MG/1
10 TABLET ORAL PRN
Status: DISCONTINUED | OUTPATIENT
Start: 2022-04-25 | End: 2022-04-25 | Stop reason: HOSPADM

## 2022-04-25 RX ORDER — SODIUM CHLORIDE 9 MG/ML
INJECTION, SOLUTION INTRAVENOUS PRN
Status: DISCONTINUED | OUTPATIENT
Start: 2022-04-25 | End: 2022-04-25 | Stop reason: HOSPADM

## 2022-04-25 RX ORDER — SODIUM CHLORIDE, SODIUM LACTATE, POTASSIUM CHLORIDE, CALCIUM CHLORIDE 600; 310; 30; 20 MG/100ML; MG/100ML; MG/100ML; MG/100ML
INJECTION, SOLUTION INTRAVENOUS CONTINUOUS
Status: DISCONTINUED | OUTPATIENT
Start: 2022-04-25 | End: 2022-04-25 | Stop reason: HOSPADM

## 2022-04-25 RX ORDER — HYDROCODONE BITARTRATE AND ACETAMINOPHEN 5; 325 MG/1; MG/1
1 TABLET ORAL EVERY 6 HOURS PRN
Qty: 10 TABLET | Refills: 0 | Status: SHIPPED | OUTPATIENT
Start: 2022-04-25 | End: 2022-04-30

## 2022-04-25 RX ORDER — DIMENHYDRINATE 50 MG/1
50 TABLET ORAL ONCE
Status: COMPLETED | OUTPATIENT
Start: 2022-04-25 | End: 2022-04-25

## 2022-04-25 RX ORDER — PHENAZOPYRIDINE HYDROCHLORIDE 100 MG/1
200 TABLET, FILM COATED ORAL ONCE
Status: COMPLETED | OUTPATIENT
Start: 2022-04-25 | End: 2022-04-25

## 2022-04-25 RX ORDER — FENTANYL CITRATE 50 UG/ML
25 INJECTION, SOLUTION INTRAMUSCULAR; INTRAVENOUS EVERY 5 MIN PRN
Status: COMPLETED | OUTPATIENT
Start: 2022-04-25 | End: 2022-04-25

## 2022-04-25 RX ORDER — FENTANYL CITRATE 50 UG/ML
INJECTION, SOLUTION INTRAMUSCULAR; INTRAVENOUS PRN
Status: DISCONTINUED | OUTPATIENT
Start: 2022-04-25 | End: 2022-04-25 | Stop reason: SDUPTHER

## 2022-04-25 RX ORDER — SODIUM CHLORIDE 0.9 % (FLUSH) 0.9 %
5-40 SYRINGE (ML) INJECTION PRN
Status: DISCONTINUED | OUTPATIENT
Start: 2022-04-25 | End: 2022-04-25 | Stop reason: HOSPADM

## 2022-04-25 RX ORDER — FAMOTIDINE 10 MG/ML
20 INJECTION, SOLUTION INTRAVENOUS ONCE
Status: COMPLETED | OUTPATIENT
Start: 2022-04-25 | End: 2022-04-25

## 2022-04-25 RX ORDER — HYDROCODONE BITARTRATE AND ACETAMINOPHEN 5; 325 MG/1; MG/1
1 TABLET ORAL EVERY 4 HOURS PRN
Status: DISCONTINUED | OUTPATIENT
Start: 2022-04-25 | End: 2022-04-25 | Stop reason: HOSPADM

## 2022-04-25 RX ORDER — ENOXAPARIN SODIUM 100 MG/ML
40 INJECTION SUBCUTANEOUS ONCE
Status: COMPLETED | OUTPATIENT
Start: 2022-04-25 | End: 2022-04-25

## 2022-04-25 RX ORDER — HYDROCODONE BITARTRATE AND ACETAMINOPHEN 5; 325 MG/1; MG/1
2 TABLET ORAL EVERY 4 HOURS PRN
Status: DISCONTINUED | OUTPATIENT
Start: 2022-04-25 | End: 2022-04-25 | Stop reason: HOSPADM

## 2022-04-25 RX ORDER — SODIUM CHLORIDE 0.9 % (FLUSH) 0.9 %
5-40 SYRINGE (ML) INJECTION EVERY 12 HOURS SCHEDULED
Status: DISCONTINUED | OUTPATIENT
Start: 2022-04-25 | End: 2022-04-25 | Stop reason: HOSPADM

## 2022-04-25 RX ORDER — KETOROLAC TROMETHAMINE 30 MG/ML
INJECTION, SOLUTION INTRAMUSCULAR; INTRAVENOUS PRN
Status: DISCONTINUED | OUTPATIENT
Start: 2022-04-25 | End: 2022-04-25 | Stop reason: SDUPTHER

## 2022-04-25 RX ORDER — PROPOFOL 10 MG/ML
INJECTION, EMULSION INTRAVENOUS PRN
Status: DISCONTINUED | OUTPATIENT
Start: 2022-04-25 | End: 2022-04-25 | Stop reason: SDUPTHER

## 2022-04-25 RX ORDER — KETOROLAC TROMETHAMINE 10 MG/1
10 TABLET, FILM COATED ORAL EVERY 6 HOURS PRN
Qty: 20 TABLET | Refills: 0 | Status: SHIPPED | OUTPATIENT
Start: 2022-04-25 | End: 2022-06-02

## 2022-04-25 RX ORDER — DEXAMETHASONE SODIUM PHOSPHATE 4 MG/ML
INJECTION, SOLUTION INTRA-ARTICULAR; INTRALESIONAL; INTRAMUSCULAR; INTRAVENOUS; SOFT TISSUE PRN
Status: DISCONTINUED | OUTPATIENT
Start: 2022-04-25 | End: 2022-04-25 | Stop reason: SDUPTHER

## 2022-04-25 RX ORDER — METOCLOPRAMIDE HYDROCHLORIDE 5 MG/ML
10 INJECTION INTRAMUSCULAR; INTRAVENOUS
Status: DISCONTINUED | OUTPATIENT
Start: 2022-04-25 | End: 2022-04-25 | Stop reason: HOSPADM

## 2022-04-25 RX ORDER — MIDAZOLAM HYDROCHLORIDE 1 MG/ML
INJECTION INTRAMUSCULAR; INTRAVENOUS PRN
Status: DISCONTINUED | OUTPATIENT
Start: 2022-04-25 | End: 2022-04-25 | Stop reason: SDUPTHER

## 2022-04-25 RX ORDER — ONDANSETRON 2 MG/ML
INJECTION INTRAMUSCULAR; INTRAVENOUS PRN
Status: DISCONTINUED | OUTPATIENT
Start: 2022-04-25 | End: 2022-04-25 | Stop reason: SDUPTHER

## 2022-04-25 RX ORDER — ONDANSETRON 2 MG/ML
4 INJECTION INTRAMUSCULAR; INTRAVENOUS
Status: DISCONTINUED | OUTPATIENT
Start: 2022-04-25 | End: 2022-04-25 | Stop reason: HOSPADM

## 2022-04-25 RX ORDER — ACETAMINOPHEN 325 MG/1
650 TABLET ORAL ONCE
Status: COMPLETED | OUTPATIENT
Start: 2022-04-25 | End: 2022-04-25

## 2022-04-25 RX ORDER — LIDOCAINE HYDROCHLORIDE 20 MG/ML
INJECTION, SOLUTION EPIDURAL; INFILTRATION; INTRACAUDAL; PERINEURAL PRN
Status: DISCONTINUED | OUTPATIENT
Start: 2022-04-25 | End: 2022-04-25 | Stop reason: SDUPTHER

## 2022-04-25 RX ORDER — FENTANYL CITRATE 50 UG/ML
50 INJECTION, SOLUTION INTRAMUSCULAR; INTRAVENOUS EVERY 5 MIN PRN
Status: DISCONTINUED | OUTPATIENT
Start: 2022-04-25 | End: 2022-04-25 | Stop reason: HOSPADM

## 2022-04-25 RX ADMIN — FENTANYL CITRATE 50 MCG: 50 INJECTION INTRAMUSCULAR; INTRAVENOUS at 12:48

## 2022-04-25 RX ADMIN — PHENAZOPYRIDINE HYDROCHLORIDE 200 MG: 100 TABLET ORAL at 15:53

## 2022-04-25 RX ADMIN — ENOXAPARIN SODIUM 40 MG: 100 INJECTION SUBCUTANEOUS at 11:10

## 2022-04-25 RX ADMIN — FENTANYL CITRATE 25 MCG: 50 INJECTION INTRAMUSCULAR; INTRAVENOUS at 14:21

## 2022-04-25 RX ADMIN — MIDAZOLAM 2 MG: 1 INJECTION INTRAMUSCULAR; INTRAVENOUS at 12:00

## 2022-04-25 RX ADMIN — FENTANYL CITRATE 25 MCG: 50 INJECTION INTRAMUSCULAR; INTRAVENOUS at 14:26

## 2022-04-25 RX ADMIN — SODIUM CHLORIDE, POTASSIUM CHLORIDE, SODIUM LACTATE AND CALCIUM CHLORIDE: 600; 310; 30; 20 INJECTION, SOLUTION INTRAVENOUS at 11:08

## 2022-04-25 RX ADMIN — DEXAMETHASONE SODIUM PHOSPHATE 4 MG: 4 INJECTION, SOLUTION INTRAMUSCULAR; INTRAVENOUS at 12:32

## 2022-04-25 RX ADMIN — KETOROLAC TROMETHAMINE 30 MG: 30 INJECTION, SOLUTION INTRAMUSCULAR at 13:40

## 2022-04-25 RX ADMIN — FAMOTIDINE 20 MG: 10 INJECTION INTRAVENOUS at 11:10

## 2022-04-25 RX ADMIN — ACETAMINOPHEN 650 MG: 325 TABLET ORAL at 11:10

## 2022-04-25 RX ADMIN — PROPOFOL 200 MG: 10 INJECTION, EMULSION INTRAVENOUS at 12:27

## 2022-04-25 RX ADMIN — SUGAMMADEX 200 MG: 100 INJECTION, SOLUTION INTRAVENOUS at 13:46

## 2022-04-25 RX ADMIN — ONDANSETRON 4 MG: 2 INJECTION INTRAMUSCULAR; INTRAVENOUS at 12:32

## 2022-04-25 RX ADMIN — LIDOCAINE HYDROCHLORIDE 2 ML: 20 INJECTION, SOLUTION EPIDURAL; INFILTRATION; INTRACAUDAL; PERINEURAL at 12:26

## 2022-04-25 RX ADMIN — Medication 3000 MG: at 12:07

## 2022-04-25 RX ADMIN — DIMENHYDRINATE 50 MG: 50 TABLET ORAL at 11:10

## 2022-04-25 RX ADMIN — HYDROCODONE BITARTRATE AND ACETAMINOPHEN 1 TABLET: 5; 325 TABLET ORAL at 15:14

## 2022-04-25 RX ADMIN — SODIUM CHLORIDE, POTASSIUM CHLORIDE, SODIUM LACTATE AND CALCIUM CHLORIDE: 600; 310; 30; 20 INJECTION, SOLUTION INTRAVENOUS at 13:01

## 2022-04-25 RX ADMIN — ROCURONIUM BROMIDE 50 MG: 10 SOLUTION INTRAVENOUS at 12:27

## 2022-04-25 RX ADMIN — FENTANYL CITRATE 50 MCG: 50 INJECTION INTRAMUSCULAR; INTRAVENOUS at 12:55

## 2022-04-25 ASSESSMENT — PAIN SCALES - GENERAL
PAINLEVEL_OUTOF10: 4
PAINLEVEL_OUTOF10: 0
PAINLEVEL_OUTOF10: 6

## 2022-04-25 ASSESSMENT — PAIN DESCRIPTION - LOCATION: LOCATION: ABDOMEN

## 2022-04-25 ASSESSMENT — PAIN DESCRIPTION - ORIENTATION: ORIENTATION: LOWER

## 2022-04-25 ASSESSMENT — PAIN DESCRIPTION - DESCRIPTORS
DESCRIPTORS: CRAMPING
DESCRIPTORS: CRAMPING;DISCOMFORT

## 2022-04-25 ASSESSMENT — PAIN - FUNCTIONAL ASSESSMENT: PAIN_FUNCTIONAL_ASSESSMENT: NONE - DENIES PAIN

## 2022-04-25 NOTE — H&P
HISTORY AND PHYSICAL             Date: 4/25/2022        Patient Name: Jabier Mahajan     YOB: 1975      Age:  51 y.o. Chief Complaint   No chief complaint on file. History Obtained From   patient    History of Present Illness   PT with adenomyosis and hx of endometrial hyperplasia; she has not been compliant with progesterone treatment, but thankfully her last usn showed lining to be WNL; has had ovarian cysts but would like ovarian conservation if possible    Past Medical History     Past Medical History:   Diagnosis Date    Diabetes mellitus ([address]     Endometrial hyperplasia, simple     Hypertension     Infertility, female     Insulin resistance     Sleep apnea     just diagnosed, does not have machine yet 4/22        Past Surgical History     Past Surgical History:   Procedure Laterality Date    DILATION AND CURETTAGE  2006    HYSTEROSCOPY  2006    KNEE SURGERY Right 1993    TONSILLECTOMY  1979        Medications Prior to Admission     Prior to Admission medications    Medication Sig Start Date End Date Taking? Authorizing Provider   ketorolac (TORADOL) 10 MG tablet Take 1 tablet by mouth every 6 hours as needed for Pain 4/25/22 4/25/23 Yes Dilcia Beard PA-C   HYDROcodone-acetaminophen (NORCO) 5-325 MG per tablet Take 1 tablet by mouth every 6 hours as needed for Pain for up to 5 days. Intended supply: 5 days.  Take lowest dose possible to manage pain 4/25/22 4/30/22 Yes Dilcia Beard PA-C   fexofenadine TY [facility] ALLERGY) 180 MG tablet Take 180 mg by mouth daily HS   Yes Historical Provider, MD   SYMBICORT 160-4.5 MCG/ACT AERO  3/21/22   Historical Provider, MD   amoxicillin-clavulanate (AUGMENTIN) 875-125 MG per tablet  4/10/22   Historical Provider, MD   albuterol sulfate  (90 Base) MCG/ACT inhaler  11/27/21   Historical Provider, MD   ascorbic acid (VITAMIN C) 1000 MG tablet Take 1,000 mg by mouth daily    Historical Provider, MD   omeprazole (PRILOSEC) 20 MG delayed release capsule  9/17/21   Historical Provider, MD   ezetimibe (ZETIA) 10 MG tablet  8/30/21   Historical Provider, MD   FARXIGA 5 MG tablet  8/31/21   Historical Provider, MD   Cholecalciferol (VITAMIN D3) 1.25 MG (14066 UT) CAPS  6/29/21   Historical Provider, MD   SITagliptin (JANUVIA) 100 MG tablet Take 100 mg by mouth daily    Historical Provider, MD   progesterone (PROMETRIUM) 200 MG capsule Take 1 capsule by mouth nightly As directed 9/13/20   Luis Eugene APRN - NP   metFORMIN (GLUCOPHAGE) 1000 MG tablet  8/23/20   Historical Provider, MD   montelukast (SINGULAIR) 10 MG tablet TAKE ONE TABLET BY MOUTH DAILY 12/2/19   Historical Provider, MD   Ferrous Gluconate (IRON 27 PO) Take by mouth    Historical Provider, MD   DAILY MULTIPLE VITAMINS/IRON PO Take by mouth    Historical Provider, MD        Allergies   Statins    Social History     Social History     Tobacco History     Smoking Status  Never Smoker    Smokeless Tobacco Use  Never Used          Alcohol History     Alcohol Use Status  Never          Drug Use     Drug Use Status  Never          Sexual Activity     Sexually Active  Yes Partners  Male                Family History     Family History   Problem Relation Age of Onset    Diabetes Father     Heart Disease Father     Cancer Sister 35        ovarian       Review of Systems   Review of Systems   Constitutional: Negative for chills and fever. Genitourinary: Positive for menstrual problem. Negative for dysuria, pelvic pain, vaginal bleeding and vaginal discharge. Physical Exam   BP (!) 145/80   Pulse 90   Temp 97.7 °F (36.5 °C) (Temporal)   Resp 15   Ht 5' 5\" (1.651 m)   Wt 286 lb (129.7 kg)   SpO2 96%   BMI 47.59 kg/m²     Physical Exam  Constitutional:       Appearance: Normal appearance. HENT:      Head: Normocephalic and atraumatic. Eyes:      Extraocular Movements: Extraocular movements intact. Pupils: Pupils are equal, round, and reactive to light. Cardiovascular:      Rate and Rhythm: Normal rate. Pulmonary:      Effort: Pulmonary effort is normal.   Musculoskeletal:         General: Normal range of motion. Cervical back: Normal range of motion. Skin:     General: Skin is warm and dry. Neurological:      Mental Status: She is alert and oriented to person, place, and time. Psychiatric:         Mood and Affect: Mood normal.         Behavior: Behavior normal.         Thought Content: Thought content normal.         Judgment: Judgment normal.         Labs      Recent Results (from the past 24 hour(s))   Pregnancy, Urine    Collection Time: 04/25/22 11:01 AM   Result Value Ref Range    HCG(Urine) Pregnancy Test NEGATIVE NEGATIVE        Imaging/Diagnostics Last 24 Hours   No results found. Assessment    1. Irregular menses  2. History of endometrial hyperplasia  3.  Endometriosis of uterus    Plan   1. RA TLH, poss BSO    Consultations Ordered:  None    Electronically signed by Rica Sanchez PA-C on 4/25/22 at 11:39 AM EDT

## 2022-04-25 NOTE — OP NOTE
Preoperative diagnosis: 1. Adenomyosis  2. Endometrial hyperplasia    Postoperative diagnosis: Same    Procedure:  Robotic-assisted Total Laparoscopic Hysterectomy with Bilateral Salpingectomy    Surgeon: Dr. Ruddy Perez    Asst.: Dilcia  JESSICA Lai PGY 2    Anesthesia: Gen. Estimated blood loss: 25 mL    Fluids: LR    Urine: 400 mL of clear urine    Condition: Stable    Complications: None    Findings: The patient had an anteverted uterus which sounded to 8 cm in depth. The tubes and ovaries were grossly within normal limits. Bilateral ureteral peristalsis was noted throughout the case and after closure of the vaginal cuff. Specimen removed: Uterus and Bilateral tubes    Operative note: The patient was taken to the operating room where general anesthesia was obtained without difficulty. She was prepped and draped usual sterile fashion in a dorsal lithotomy position. Timeout was performed. A weighted speculum was placed in the patient's vagina and the anterior lip of the cervix was grasped with a single-tooth tenaculum. The cervix was progressively dilated and the uterus was sounded with the findings noted above. A  care uterine manipulator was then placed. A Rios catheter was placed and left to gravity drainage. At this point attention was turned to the patient's abdomen where a supraumbilical incision was made with a scalpel. An 8 mm skin incision was made. A veress needle was then carefully introduced at a 45° angle while tenting the abdominal wall. Intraabdominal placement was confirmed by use of water-filled syringe and drop in intra-abdominal pressure with insufflation of CO2. Pneumoperitoneum was obtained with 2.5 liters of CO2. An 8 mm robotic port was then placed without difficulty and intra-abdominal placement was confirmed by laparoscopy.   Second and third ports were then placed under direct visualization approximately 4 cm inferior and  5 cm lateral to the first.  8 mm robotic ports were placed in these locations as well. A fourth and final port was placed lateral to the right. We placed a robotic port in this location. At this point the patient was placed into steep Trendelenburg position. The robot was brought into position and docked. The right arm was loaded with the scissors with monopolar cautery. The left side was loaded with the fenestrated bipolar. Attention was then turned to the console portion of the surgery. The left tube was tented and the mesosalpinx of the broad ligament was serially ligated and transected with the fenestrated bipolar toward the cornua of the uterus. The ovarian pedicle was then ligated with the fenestrated bipolar and transected with the scissors. Dissection continued along the broad ligament until the round ligament was ligated and transected. The anterior uterine serosa was then undermined and taken down to free the bladder from the lower uterine segment and cervix. This was done working from the left side to the midline. Attention was then turned to the right cornual region of the uterus and in a similar manner the tube and ovary were ligated and transected. Dissection again continued along the broad ligament until the round ligament was ligated and transected. The remainder of the bladder flap was then taken down. Once the bladder was freed from the lower uterine segment and cervix, the uterine arteries were skeletonized, ligated, and transected bilaterally. Using the scissors, the uterus was amputated just beneath the cervix using the groove of the V care as a guide. The uterus and tubes were then withdrawn through the vagina and a sponge stick was placed to maintain pneumoperitoneum. The vaginal cuff was then closed with V lock suture working from right to left using 2-0 suture. Copious amounts of irrigation were then used to evaluate the cuff and pedicles to assure hemostasis.   Surgicel Lauralyn Humbles was placed over the vaginal cuff for continued hemostasis. Bilateral ureteral peristalsis was again noted. At this point the instruments removed from the abdomen. The skin incisions were closed with 4-0 Monocryl in a subcuticular fashion. The uterus and bilateral tubes were handed off to pathology. The vaginal cuff was then examined with no evidence of bleeding. The Rios catheter was removed. Sponge, lap, needle, and instrument counts were correct ×2 and the patient was taken to the recovery area in apparently stable condition. Surgical Assistant documentation:    An assistant surgeon was required with this surgery. She was able to provide the necessary visualization, uterine manipulation, and suturing. Having her available allowed this case to be perfomed in a safe and timely manner.

## 2022-04-25 NOTE — ANESTHESIA PROCEDURE NOTES
Peripheral Block    Patient location during procedure: pre-op  Start time: 4/25/2022 12:00 PM  End time: 4/25/2022 12:07 PM  Staffing  Performed: resident/CRNA   Resident/CRNA: JING Saravia CRNA  Preanesthetic Checklist  Completed: patient identified, IV checked, site marked, risks and benefits discussed, surgical consent, monitors and equipment checked, pre-op evaluation, timeout performed, anesthesia consent given, oxygen available and patient being monitored  Peripheral Block  Patient position: supine  Prep: ChloraPrep  Patient monitoring: cardiac monitor, continuous pulse ox, IV access and frequent blood pressure checks  Block type: TAP  Laterality: bilateral  Injection technique: single-shot  Guidance: ultrasound guided  Local infiltration: decadron and ropivacaine  Infiltration strength: 0.5 %  Dose: 60 mL  Provider prep: sterile gloves and mask  Local infiltration: decadron and ropivacaine  Needle  Needle type:  Other   Needle gauge: 22 G  Needle length: 11 cm  Needle localization: ultrasound guidanceOther needle type: [place]  Assessment  Injection assessment: negative aspiration for heme, no paresthesia on injection and local visualized surrounding nerve on ultrasound  Paresthesia pain: none  Slow fractionated injection: yes  Hemodynamics: stable  Reason for block: post-op pain management and at surgeon's request

## 2022-04-25 NOTE — PROGRESS NOTES
Up to bathroom upon arrival to phase 2. Patient able to urinate and states \"some burning\" but no other issues. Scant amount of bloody drainage noted on pad. New pad provided. Patient ambulated to chair with steady gait. Patient tolerated well.

## 2022-04-25 NOTE — ANESTHESIA POSTPROCEDURE EVALUATION
Department of Anesthesiology  Postprocedure Note    Patient: Zeyad Mahajan  MRN: [id number]  YOB: 1975  Date of evaluation: 4/25/2022  Time:  4:54 PM     Procedure Summary     Date: 04/25/22 Room / Location: [address] [place] [address]    Anesthesia Start: 1218 Anesthesia Stop: 5050    Procedure: HYSTERECTOMY VAGINAL LAPAROSCOPIC ROBOTIC ASSISTED-  Bilateral Salpingectomy (N/A ) Diagnosis: (LEFT OVARIAN CYST, HX OF ENDOMETRIAL HYPERPLASIA IRREGULAR MENSES)    Surgeons: Thony Villalpando DO Responsible Provider: JING Asher CRNA    Anesthesia Type: general, regional ASA Status: 3          Anesthesia Type: general, regional    Denny Phase I: Denny Score: 9    Denny Phase II: Denny Score: 10    Last vitals: Reviewed and per EMR flowsheets.        Anesthesia Post Evaluation    Patient location during evaluation: PACU  Patient participation: complete - patient participated  Level of consciousness: awake and alert  Pain score: 4  Airway patency: patent  Nausea & Vomiting: no nausea and no vomiting  Complications: no  Cardiovascular status: blood pressure returned to baseline  Respiratory status: acceptable and room air  Hydration status: stable

## 2022-04-25 NOTE — ANESTHESIA PRE PROCEDURE
Department of Anesthesiology  Preprocedure Note       Name:  Asmita Mahajan   Age:  51 y.o.  :  1975                                          MRN:  [id number]         Date:  2022      Surgeon: Briseyda Bradley):  Too Levy DO    Procedure: Procedure(s): HYSTERECTOMY VAGINAL LAPAROSCOPIC ROBOTIC ASSISTED- POSS BSO, POSSIBLE LAP COLPOPEXY    Medications prior to admission:   Prior to Admission medications    Medication Sig Start Date End Date Taking? Authorizing Provider   ketorolac (TORADOL) 10 MG tablet Take 1 tablet by mouth every 6 hours as needed for Pain 22 Yes Melia Lam PA-C   HYDROcodone-acetaminophen (NORCO) 5-325 MG per tablet Take 1 tablet by mouth every 6 hours as needed for Pain for up to 5 days. Intended supply: 5 days.  Take lowest dose possible to manage pain 22 Yes Melia Lam PA-C   fexofenadine TY [facility] ALLERGY) 180 MG tablet Take 180 mg by mouth daily HS   Yes Historical Provider, MD   SYMBICORT 160-4.5 MCG/ACT AERO  3/21/22   Historical Provider, MD   amoxicillin-clavulanate (AUGMENTIN) 875-125 MG per tablet  4/10/22   Historical Provider, MD   albuterol sulfate  (90 Base) MCG/ACT inhaler  21   Historical Provider, MD   ascorbic acid (VITAMIN C) 1000 MG tablet Take 1,000 mg by mouth daily    Historical Provider, MD   omeprazole (PRILOSEC) 20 MG delayed release capsule  21   Historical Provider, MD   ezetimibe (ZETIA) 10 MG tablet  21   Historical Provider, MD   FARXIGA 5 MG tablet  21   Historical Provider, MD   Cholecalciferol (VITAMIN D3) 1.25 MG (61147 UT) CAPS  21   Historical Provider, MD   SITagliptin (JANUVIA) 100 MG tablet Take 100 mg by mouth daily    Historical Provider, MD   progesterone (PROMETRIUM) 200 MG capsule Take 1 capsule by mouth nightly As directed 20   Shaunna Mcardle, APRN - NP   metFORMIN (GLUCOPHAGE) 1000 MG tablet  20   Historical Provider, MD   montelukast (SINGULAIR) 10 MG tablet TAKE ONE TABLET BY MOUTH DAILY 12/2/19   Historical Provider, MD   Ferrous Gluconate (IRON 27 PO) Take by mouth    Historical Provider, MD   DAILY MULTIPLE VITAMINS/IRON PO Take by mouth    Historical Provider, MD       Current medications:    Current Facility-Administered Medications   Medication Dose Route Frequency Provider Last Rate Last Admin    lactated ringers infusion   IntraVENous Continuous JING Jimenes - CRNA 100 mL/hr at 04/25/22 1108 New Bag at 04/25/22 1108    sodium chloride flush 0.9 % injection 5-40 mL  5-40 mL IntraVENous 2 times per day Daune Busing, PA-C        sodium chloride flush 0.9 % injection 5-40 mL  5-40 mL IntraVENous PRN Daune Busing, PA-C        0.9 % sodium chloride infusion   IntraVENous PRN Newune Daviding, PA-C        ceFAZolin (ANCEF) 3000 mg in dextrose 5 % 100 mL IVPB  3,000 mg IntraVENous On Call to Spencer Rosen, PAMyaC           Allergies:     Allergies   Allergen Reactions    Statins      Body aches       Problem List:    Patient Active Problem List   Diagnosis Code    Endometrial hyperplasia, simple N85.01    Family history of ovarian cancer Z80.41       Past Medical History:        Diagnosis Date    Diabetes mellitus ([facility] [address]     Endometrial hyperplasia, simple     Hypertension     Infertility, female     Insulin resistance     Sleep apnea     just diagnosed, does not have machine yet 4/22       Past Surgical History:        Procedure Laterality Date    DILATION AND CURETTAGE  2006    HYSTEROSCOPY  2006    KNEE SURGERY Right 1993    TONSILLECTOMY  1979       Social History:    Social History     Tobacco Use    Smoking status: Never Smoker    Smokeless tobacco: Never Used   Substance Use Topics    Alcohol use: Never                                Counseling given: Not Answered      Vital Signs (Current):   Vitals:    04/25/22 1104   BP: (!) 145/80   Pulse: 90   Resp: 15   Temp: 36.5 °C (97.7 °F)   TempSrc: Temporal SpO2: 96%   Weight: 286 lb (129.7 kg)   Height: 5' 5\" (1.651 m)                                              BP Readings from Last 3 Encounters:   04/25/22 (!) 145/80   04/13/22 118/72   01/03/22 120/74       NPO Status: Time of last liquid consumption: 2230                        Time of last solid consumption: 2130                        Date of last liquid consumption: 04/24/22                        Date of last solid food consumption: 04/24/22    BMI:   Wt Readings from Last 3 Encounters:   04/25/22 286 lb (129.7 kg)   04/13/22 287 lb (130.2 kg)   01/03/22 282 lb 12.8 oz (128.3 kg)     Body mass index is 47.59 kg/m². CBC:   Lab Results   Component Value Date    WBC 12.3 04/13/2022    RBC 5.01 04/13/2022    HGB 14.3 04/13/2022    HCT 43.9 04/13/2022    MCV 87.6 04/13/2022    RDW 13.0 04/13/2022     04/13/2022       CMP:   Lab Results   Component Value Date     04/13/2022    K 4.4 04/13/2022     04/13/2022    CO2 21 04/13/2022    BUN 20 04/13/2022    CREATININE 0.83 04/13/2022    GFRAA >60 04/13/2022    LABGLOM >60 04/13/2022    GLUCOSE 230 04/13/2022    PROT 7.5 04/13/2022    CALCIUM 10.1 04/13/2022    BILITOT 0.21 04/13/2022    ALKPHOS 61 04/13/2022    AST 18 04/13/2022    ALT 26 04/13/2022       POC Tests: No results for input(s): POCGLU, POCNA, POCK, POCCL, POCBUN, POCHEMO, POCHCT in the last 72 hours. Coags: No results found for: PROTIME, INR, APTT    HCG (If Applicable):   Lab Results   Component Value Date    PREGTESTUR NEGATIVE 04/25/2022        ABGs: No results found for: PHART, PO2ART, NUO4BEH, RYK5WDT, BEART, K4BKJJTB     Type & Screen (If Applicable):  No results found for: LABABO, LABRH    Drug/Infectious Status (If Applicable):  No results found for: HIV, HEPCAB    COVID-19 Screening (If Applicable): No results found for: COVID19        Anesthesia Evaluation  Patient summary reviewed and Nursing notes reviewed   history of anesthetic complications: PONV.   Airway: Mallampati: II  TM distance: >3 FB   Neck ROM: full  Mouth opening: > = 3 FB Dental:          Pulmonary:normal exam    (+) sleep apnea (moderate sleep apnea, waiting for apnea machine):  asthma:                            Cardiovascular:  Exercise tolerance: good (>4 METS),   (+) hypertension:,     (-) CABG/stent and  angina    ECG reviewed               Beta Blocker:  Not on Beta Blocker      ROS comment: 4/2/22  Normal sinus rhythm  Normal ECG  No previous ECGs available     Neuro/Psych:   Negative Neuro/Psych ROS              GI/Hepatic/Renal:   (+) GERD: well controlled, morbid obesity          Endo/Other:    (+) DiabetesType II DM, , .                 Abdominal:   (+) obese,           Vascular: negative vascular ROS. Other Findings:             Anesthesia Plan      general and regional     ASA 3       Induction: intravenous. MIPS: Prophylactic antiemetics administered. Anesthetic plan and risks discussed with patient. Use of blood products discussed with patient whom consented to blood products.                    JING Paige - CRNA   4/25/2022

## 2022-04-25 NOTE — PROGRESS NOTES
Patient verbalizes readiness for discharge at this time. Discharge Criteria    Inpatients must meet Criteria 1 through 7. All other patients are either YES or N/A. If a NO is chosen then Anesthesia or Surgeon must be notified. 1.  Minimum 30 minutes after last dose of sedative medication, minimum 120 minutes after last dose of reversal agent. Yes      2. Systolic BP stable within 20 mmHg for 30 minutes & systolic BP between 90 & 024 or within 10 mmHg of baseline. Yes      3. Pulse between 60 and 100 or within 10 bpm of baseline. Yes      4. Spontaneous respiratory rate >/= 10 per minute. Yes      5. SaO2 >/= 95 or  >/= baseline. Yes      6. Able to cough and swallow or return to baseline function. Yes      7. Alert and oriented or return to baseline mental status. Yes      8. Demonstrates controlled, coordinated movements, ambulates with steady gait, or return to baseline activity function. Yes      9. Minimal or no pain or nausea, or at a level tolerable and acceptable to patient. Yes      10. Takes and retains oral fluids as allowed. Yes      11. Procedural / perioperative site stable. Minimal or no bleeding. Yes          12. If GI endoscopy procedure, minimal or no abdominal distention or passing flatus. N/A      13. Written discharge instructions and emergency telephone number provided. Yes      14. Accompanied by a responsible adult.     Yes

## 2022-04-26 ENCOUNTER — TELEPHONE (OUTPATIENT)
Dept: OBGYN CLINIC | Age: 47
End: 2022-04-26

## 2022-04-26 RX ORDER — IBUPROFEN 800 MG/1
800 TABLET ORAL EVERY 8 HOURS PRN
Qty: 30 TABLET | Refills: 0 | Status: SHIPPED | OUTPATIENT
Start: 2022-04-26

## 2022-04-27 LAB — SURGICAL PATHOLOGY REPORT: NORMAL

## 2022-05-02 RX ORDER — IBUPROFEN 800 MG/1
TABLET ORAL
Qty: 30 TABLET | Refills: 0 | OUTPATIENT
Start: 2022-05-02

## 2022-05-04 ENCOUNTER — OFFICE VISIT (OUTPATIENT)
Dept: OBGYN CLINIC | Age: 47
End: 2022-05-04

## 2022-05-04 VITALS — SYSTOLIC BLOOD PRESSURE: 128 MMHG | DIASTOLIC BLOOD PRESSURE: 74 MMHG

## 2022-05-04 DIAGNOSIS — Z09 POSTOPERATIVE EXAMINATION: Primary | ICD-10-CM

## 2022-05-04 PROCEDURE — 99024 POSTOP FOLLOW-UP VISIT: CPT

## 2022-05-04 NOTE — PROGRESS NOTES
Postop Progress Note    Subjective    Radha Mahajan presents to the office for postop follow up. Chief Complaint   Patient presents with    Post-Op Check     Here for a 2 wk post-op TLH, doing great after surgery, incisions are still a little tender, still has steri-strips, denies any urinary/vaginal symptoms       Objective    Vitals:    05/04/22 1331   BP: 128/74     General: alert, cooperative and no distress  Incision: healing well    Assessment  Doing well postoperatively. Denies spotting/discharge. Has occasional cramping on right side. Steri-strips still in place - instructed to remove at her convenience. Instructed to continue with restrictions until 6-week visit. Plan  1. Continue any current medications  2. Wound care discussed  3. Pt is to continue with restrictions  4. Usual diet  5.  Follow up: 4 weeks    Electronically signed by Anise Babinski, PA-C on 5/4/2022 at 1:49 PM

## 2022-06-02 ENCOUNTER — HOSPITAL ENCOUNTER (OUTPATIENT)
Age: 47
Setting detail: SPECIMEN
Discharge: HOME | End: 2022-06-02

## 2022-06-02 ENCOUNTER — OFFICE VISIT (OUTPATIENT)
Dept: OBGYN CLINIC | Age: 47
End: 2022-06-02

## 2022-06-02 VITALS — SYSTOLIC BLOOD PRESSURE: 124 MMHG | BODY MASS INDEX: 47.43 KG/M2 | DIASTOLIC BLOOD PRESSURE: 86 MMHG | WEIGHT: 285 LBS

## 2022-06-02 DIAGNOSIS — Z09 POSTOPERATIVE EXAMINATION: Primary | ICD-10-CM

## 2022-06-02 DIAGNOSIS — R10.2 VAGINAL PAIN: ICD-10-CM

## 2022-06-02 LAB
CANDIDA SPECIES, DNA PROBE: POSITIVE
GARDNERELLA VAGINALIS, DNA PROBE: NEGATIVE
SOURCE: ABNORMAL
TRICHOMONAS VAGINALIS DNA: NEGATIVE

## 2022-06-02 PROCEDURE — 99024 POSTOP FOLLOW-UP VISIT: CPT

## 2022-06-02 NOTE — PROGRESS NOTES
Postop Progress Note    Subjective    Radha Mahajan presents to the office for postop follow up. Chief Complaint   Patient presents with    Post-Op Check     Pt. had Kavita Justice 105 on 4-. Pt. reports that she is doing well. She is getting occasional sharp shooting vaginal pains. Last week she went on vacation to [place] and 3 different times this occured and each time she needed to have a bowel movement. Last night it occured and she did not need to  have bowel movement. Objective    Vitals:    06/02/22 1054   BP: 124/86     General: alert, cooperative and no distress  Incision: healing well    Assessment  Doing well postoperatively. Reports sharp, shooting vaginal pain at times. Started when she had BM but has since has occurrences where it came on without BM. Discussed better mechanics for BM to avoid straining. Reassured that this should continue to improve. Vaginal culture obtained to rule out infectious cause. Released patient to return to work and normal activity. Plan  1. Continue any current medications  2. Wound care discussed  3. Pt is to increase activities as tolerated  4. Usual diet  5.  Follow up: annual visit - usually in September but pt will probably push out a bit since she just had surgery    Electronically signed by Lucas Ribeiro PA-C on 6/2/2022 at 11:32 AM

## 2022-06-06 RX ORDER — FLUCONAZOLE 150 MG/1
150 TABLET ORAL ONCE
Qty: 1 TABLET | Refills: 0 | Status: SHIPPED | OUTPATIENT
Start: 2022-06-06 | End: 2022-06-06

## 2022-06-07 ENCOUNTER — OFFICE VISIT (OUTPATIENT)
Dept: OBGYN CLINIC | Age: 47
End: 2022-06-07
Payer: COMMERCIAL

## 2022-06-07 VITALS — BODY MASS INDEX: 47.26 KG/M2 | WEIGHT: 284 LBS | DIASTOLIC BLOOD PRESSURE: 78 MMHG | SYSTOLIC BLOOD PRESSURE: 128 MMHG

## 2022-06-07 DIAGNOSIS — R10.84 GENERALIZED ABDOMINAL PAIN: Primary | ICD-10-CM

## 2022-06-07 PROBLEM — E11.9 DM (DIABETES MELLITUS) (HCC): Status: ACTIVE | Noted: 2020-01-07

## 2022-06-07 PROBLEM — J96.11 CHRONIC RESPIRATORY FAILURE WITH HYPOXIA (HCC): Status: ACTIVE | Noted: 2022-01-20

## 2022-06-07 PROBLEM — E11.9 TYPE 2 DIABETES MELLITUS WITHOUT COMPLICATION, WITHOUT LONG-TERM CURRENT USE OF INSULIN (HCC): Status: ACTIVE | Noted: 2017-07-11

## 2022-06-07 PROBLEM — I10 HYPERTENSION: Status: ACTIVE | Noted: 2017-07-11

## 2022-06-07 PROBLEM — K21.9 GERD (GASTROESOPHAGEAL REFLUX DISEASE): Status: ACTIVE | Noted: 2017-07-11

## 2022-06-07 PROCEDURE — G8417 CALC BMI ABV UP PARAM F/U: HCPCS

## 2022-06-07 PROCEDURE — G8427 DOCREV CUR MEDS BY ELIG CLIN: HCPCS

## 2022-06-07 PROCEDURE — 99213 OFFICE O/P EST LOW 20 MIN: CPT

## 2022-06-07 PROCEDURE — 1036F TOBACCO NON-USER: CPT

## 2022-06-07 RX ORDER — FLUCONAZOLE 150 MG/1
TABLET ORAL
COMMUNITY
Start: 2022-06-06

## 2022-06-07 ASSESSMENT — ENCOUNTER SYMPTOMS
ABDOMINAL PAIN: 1
CONSTIPATION: 0
NAUSEA: 1
DIARRHEA: 0
VOMITING: 0

## 2022-06-07 NOTE — PROGRESS NOTES
DATE OF VISIT:  6/7/22    PATIENT NAME:  Candice Mahajan     YOB: 1975    REASON FOR VISIT:    Chief Complaint   Patient presents with    Abdominal Cramping     Went back to work today after surgery in April. After moving a patient at work she got cramping type pain across her mid abdomen. She was sent home from work for this and now the pain is aching. HISTORY OF PRESENT ILLNESS:  Patient presents with abdominal cramping. 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 She states that this morning she was lifting her patient and her coworker actually did not help her. States that this patient is >150 lb. She then felt some severe abdominal cramping and nausea. States that she has had a GI bug recently and is wondering if this is contributing. Denies any vaginal symptoms or bleeding. Patient will now return to work in one week with understanding that she does need to have assistance with lifting. She plans to speak to her supervisor but understands that she can reach out for today's office note if any difficulties with getting assistance approved. Recommended that she rest as much as possible and take tylenol/ibuprofen for pain management. Patient's last menstrual period was 02/15/2022 (approximate). Vitals:    06/07/22 1252   BP: 128/78   Site: Right Lower Arm   Position: Sitting   Weight: 284 lb (128.8 kg)     Body mass index is 47.26 kg/m².   Allergies   Allergen Reactions    Statins      Body aches     Current Outpatient Medications   Medication Sig Dispense Refill    SYMBICORT 160-4.5 MCG/ACT AERO       fexofenadine (ALLEGRA ALLERGY) 180 MG tablet Take 180 mg by mouth daily HS      ascorbic acid (VITAMIN C) 1000 MG tablet Take 1,000 mg by mouth daily      omeprazole (PRILOSEC) 20 MG delayed release capsule       ezetimibe (ZETIA) 10 MG tablet       FARXIGA 5 MG tablet       Cholecalciferol (VITAMIN D3) 1.25 MG (12682 UT) CAPS       SITagliptin (JANUVIA) 100 MG tablet Take 100 mg by mouth daily      metFORMIN (GLUCOPHAGE) 1000 MG tablet       montelukast (SINGULAIR) 10 MG tablet TAKE ONE TABLET BY MOUTH DAILY      Ferrous Gluconate (IRON 27 PO) Take by mouth      DAILY MULTIPLE VITAMINS/IRON PO Take by mouth      fluconazole (DIFLUCAN) 150 MG tablet       ibuprofen (ADVIL;MOTRIN) 800 MG tablet Take 1 tablet by mouth every 8 hours as needed for Pain 30 tablet 0    albuterol sulfate  (90 Base) MCG/ACT inhaler        No current facility-administered medications for this visit. Social History     Socioeconomic History    Marital status:      Spouse name: None    Number of children: None    Years of education: None    Highest education level: None   Occupational History    None   Tobacco Use    Smoking status: Never Smoker    Smokeless tobacco: Never Used   Vaping Use    Vaping Use: Never used   Substance and Sexual Activity    Alcohol use: Never    Drug use: Never    Sexual activity: Yes     Partners: Male   Other Topics Concern    None   Social History Narrative    None     Social Determinants of Health     Financial Resource Strain:     Difficulty of Paying Living Expenses: Not on file   Food Insecurity:     Worried About Running Out of Food in the Last Year: Not on file    Reina of Food in the Last Year: Not on file   Transportation Needs:     Lack of Transportation (Medical): Not on file    Lack of Transportation (Non-Medical):  Not on file   Physical Activity:     Days of Exercise per Week: Not on file    Minutes of Exercise per Session: Not on file   Stress:     Feeling of Stress : Not on file   Social Connections:     Frequency of Communication with Friends and Family: Not on file    Frequency of Social Gatherings with Friends and Family: Not on file    Attends Evangelical Services: Not on file   [employer] of Clubs or Organizations: Not on file    Attends Club or Organization Meetings: Not on file    Marital Status: Not on file   Intimate Partner Violence:     Fear of Current or Ex-Partner: Not on file    Emotionally Abused: Not on file    Physically Abused: Not on file    Sexually Abused: Not on file   Housing Stability:     Unable to Pay for Housing in the Last Year: Not on file    Number of [place] in the Last Year: Not on file    Unstable Housing in the Last Year: Not on file       REVIEW OF SYSTEMS:  Review of Systems   Constitutional: Negative for chills, fatigue and fever. Gastrointestinal: Positive for abdominal pain and nausea. Negative for constipation, diarrhea and vomiting. Genitourinary: Negative for dysuria, menstrual problem, pelvic pain, vaginal bleeding, vaginal discharge and vaginal pain. PHYSICAL EXAM:  /78 (Site: Right Lower Arm, Position: Sitting)   Wt 284 lb (128.8 kg)   LMP 02/15/2022 (Approximate)   BMI 47.26 kg/m²   Physical Exam  Constitutional:       Appearance: Normal appearance. HENT:      Head: Normocephalic and atraumatic. Mouth/Throat:      Mouth: Mucous membranes are moist.   Eyes:      Extraocular Movements: Extraocular movements intact. Musculoskeletal:         General: Normal range of motion. Cervical back: Normal range of motion. Neurological:      General: No focal deficit present. Mental Status: She is alert and oriented to person, place, and time. Skin:     General: Skin is warm and dry. Psychiatric:         Mood and Affect: Mood normal.         Behavior: Behavior normal.         Thought Content: Thought content normal.       The patient, Radha Herrera is a 49 y.o. female, was seen with a total time spent of 20 minutes for the visit on this date of service by the E/M provider. The time component had both face to face and non face to face time spent in determining the total time component.   Counseling and education regarding her diagnosis listed below and her options regarding those diagnoses were also included in determining her time component. The patient had her preventative health maintenance recommendations and follow-up reviewed with her at the completion of her visit. ASSESSMENT:  1. Generalized abdominal pain        PLAN:  No orders of the defined types were placed in this encounter. Return if symptoms worsen or fail to improve.        Electronically signed by Woodrow Lu PA-C on 06/07/22

## 2023-05-30 ENCOUNTER — OFFICE VISIT (OUTPATIENT)
Dept: OBGYN CLINIC | Age: 48
End: 2023-05-30
Payer: COMMERCIAL

## 2023-05-30 ENCOUNTER — HOSPITAL ENCOUNTER (OUTPATIENT)
Age: 48
Setting detail: SPECIMEN
Discharge: HOME | End: 2023-05-30

## 2023-05-30 VITALS
BODY MASS INDEX: 47.92 KG/M2 | SYSTOLIC BLOOD PRESSURE: 122 MMHG | WEIGHT: 287.6 LBS | DIASTOLIC BLOOD PRESSURE: 82 MMHG | HEIGHT: 65 IN

## 2023-05-30 DIAGNOSIS — Z01.419 WOMEN'S ANNUAL ROUTINE GYNECOLOGICAL EXAMINATION: Primary | ICD-10-CM

## 2023-05-30 DIAGNOSIS — Z80.41 FAMILY HISTORY OF MALIGNANT NEOPLASM OF OVARY IN FIRST DEGREE RELATIVE: ICD-10-CM

## 2023-05-30 LAB — CANCER AG125 SERPL-ACNC: 10 U/ML

## 2023-05-30 PROCEDURE — 99396 PREV VISIT EST AGE 40-64: CPT | Performed by: NURSE PRACTITIONER

## 2023-05-30 PROCEDURE — 3074F SYST BP LT 130 MM HG: CPT | Performed by: NURSE PRACTITIONER

## 2023-05-30 PROCEDURE — 3079F DIAST BP 80-89 MM HG: CPT | Performed by: NURSE PRACTITIONER

## 2023-05-30 ASSESSMENT — ENCOUNTER SYMPTOMS
DIARRHEA: 0
CHEST TIGHTNESS: 0
CONSTIPATION: 0
ABDOMINAL PAIN: 0
SHORTNESS OF BREATH: 0

## 2023-05-30 NOTE — PROGRESS NOTES
YEARLY PHYSICAL    Date of service: 2023    Radha Mahajan  Is a 49 y.o. female    PT's PCP is: Maldonado Fowler MD     : 1975                                         Chaperone for Intimate Exam  Chaperone was offered as part of the rooming process. Patient declined and agrees to continue with exam without a chaperone. Chaperone: n/a      Subjective:       Patient's last menstrual period was 02/15/2022 (approximate). Are your menses regular: not applicable    OB History    Para Term  AB Living   0 0 0 0 0 0   SAB IAB Ectopic Molar Multiple Live Births   0 0 0 0 0 0        Social History     Tobacco Use   Smoking Status Never   Smokeless Tobacco Never        Social History     Substance and Sexual Activity   Alcohol Use Never       Family History   Problem Relation Age of Onset    Diabetes Father     Heart Disease Father     Cancer Sister 35        ovarian       Any family history of breast or ovarian cancer:  Yes    Any family history of blood clots: No      Allergies: Statins      Current Outpatient Medications:     Calcium Carb-Cholecalciferol (CALCIUM 500/D PO), Take by mouth, Disp: , Rfl:     ibuprofen (ADVIL;MOTRIN) 800 MG tablet, Take 1 tablet by mouth every 8 hours as needed for Pain, Disp: 30 tablet, Rfl: 0    fexofenadine (ALLEGRA ALLERGY) 180 MG tablet, Take 180 mg by mouth daily HS, Disp: , Rfl:     albuterol sulfate  (90 Base) MCG/ACT inhaler, , Disp: , Rfl:     ascorbic acid (VITAMIN C) 1000 MG tablet, Take 1,000 mg by mouth daily, Disp: , Rfl:     omeprazole (PRILOSEC) 20 MG delayed release capsule, , Disp: , Rfl:     ezetimibe (ZETIA) 10 MG tablet, , Disp: , Rfl:     FARXIGA 5 MG tablet, , Disp: , Rfl:     Cholecalciferol (VITAMIN D3) 1.25 MG (66183 UT) CAPS, , Disp: , Rfl:     SITagliptin (JANUVIA) 100 MG tablet, Take 100 mg by mouth daily, Disp: , Rfl:     metFORMIN (GLUCOPHAGE) 1000 MG

## 2024-06-12 ENCOUNTER — OFFICE VISIT (OUTPATIENT)
Dept: OBGYN CLINIC | Age: 49
End: 2024-06-12
Payer: COMMERCIAL

## 2024-06-12 VITALS
HEIGHT: 65 IN | BODY MASS INDEX: 46.75 KG/M2 | DIASTOLIC BLOOD PRESSURE: 84 MMHG | SYSTOLIC BLOOD PRESSURE: 122 MMHG | WEIGHT: 280.6 LBS

## 2024-06-12 DIAGNOSIS — Z01.419 WOMEN'S ANNUAL ROUTINE GYNECOLOGICAL EXAMINATION: Primary | ICD-10-CM

## 2024-06-12 PROCEDURE — 99396 PREV VISIT EST AGE 40-64: CPT | Performed by: NURSE PRACTITIONER

## 2024-06-12 PROCEDURE — 3074F SYST BP LT 130 MM HG: CPT | Performed by: NURSE PRACTITIONER

## 2024-06-12 PROCEDURE — 3079F DIAST BP 80-89 MM HG: CPT | Performed by: NURSE PRACTITIONER

## 2024-06-12 RX ORDER — DAPAGLIFLOZIN 10 MG/1
10 TABLET, FILM COATED ORAL DAILY
COMMUNITY
Start: 2024-05-24

## 2024-06-12 ASSESSMENT — ENCOUNTER SYMPTOMS
CONSTIPATION: 0
SHORTNESS OF BREATH: 0
ABDOMINAL PAIN: 0
DIARRHEA: 0

## 2024-06-12 NOTE — PROGRESS NOTES
YEARLY PHYSICAL    Date of service: 2024    Radha Mahajan  Is a 49 y.o. female    PT's PCP is: Trey Huddleston MD     : 1975                                         Chaperone for Intimate Exam  Chaperone was offered as part of the rooming process. Patient declined and agrees to continue with exam without a chaperone.  Chaperone: n/a      Subjective:       Patient's last menstrual period was 02/15/2022 (approximate).     Are your menses regular: not applicable    OB History    Para Term  AB Living   0 0 0 0 0 0   SAB IAB Ectopic Molar Multiple Live Births   0 0 0 0 0 0        Social History     Tobacco Use   Smoking Status Never   Smokeless Tobacco Never        Social History     Substance and Sexual Activity   Alcohol Use Never       Family History   Problem Relation Age of Onset    Diabetes Father     Heart Disease Father     Cancer Sister 33        ovarian       Any family history of breast or ovarian cancer: Yes    Any family history of blood clots: No      Allergies: Statins      Current Outpatient Medications:     FARXIGA 10 MG tablet, Take 1 tablet by mouth daily, Disp: , Rfl:     fluocinonide (LIDEX) 0.05 % cream, APPLY AS DIRECTED TO THE AFFECTED AREA. BRING TO THERAPY AND THERAPIST WILL APPLY AS NEEDED, Disp: , Rfl:     Calcium Carb-Cholecalciferol (CALCIUM 500/D PO), Take by mouth, Disp: , Rfl:     ibuprofen (ADVIL;MOTRIN) 800 MG tablet, Take 1 tablet by mouth every 8 hours as needed for Pain, Disp: 30 tablet, Rfl: 0    fexofenadine (ALLEGRA ALLERGY) 180 MG tablet, Take 180 mg by mouth daily HS, Disp: , Rfl:     albuterol sulfate  (90 Base) MCG/ACT inhaler, , Disp: , Rfl:     ascorbic acid (VITAMIN C) 1000 MG tablet, Take 1,000 mg by mouth daily, Disp: , Rfl:     omeprazole (PRILOSEC) 20 MG delayed release capsule, , Disp: , Rfl:     ezetimibe (ZETIA) 10 MG tablet, , Disp: , Rfl:

## 2025-01-29 NOTE — TELEPHONE ENCOUNTER
Attempted to call patient to schedule her procedure and had to leave a message. Left details regarding surgery expectations and recovery. Left available dates also. She will look at her calendar and call back with her decision. Detail Level: Zone Detail Level: Generalized Detail Level: Simple Detail Level: Detailed

## 2025-06-16 ENCOUNTER — OFFICE VISIT (OUTPATIENT)
Dept: OBGYN CLINIC | Age: 50
End: 2025-06-16
Payer: COMMERCIAL

## 2025-06-16 VITALS
BODY MASS INDEX: 46.98 KG/M2 | HEIGHT: 65 IN | WEIGHT: 282 LBS | SYSTOLIC BLOOD PRESSURE: 128 MMHG | DIASTOLIC BLOOD PRESSURE: 76 MMHG

## 2025-06-16 DIAGNOSIS — Z01.419 WOMEN'S ANNUAL ROUTINE GYNECOLOGICAL EXAMINATION: Primary | ICD-10-CM

## 2025-06-16 PROCEDURE — 3074F SYST BP LT 130 MM HG: CPT | Performed by: NURSE PRACTITIONER

## 2025-06-16 PROCEDURE — 99396 PREV VISIT EST AGE 40-64: CPT | Performed by: NURSE PRACTITIONER

## 2025-06-16 PROCEDURE — 3078F DIAST BP <80 MM HG: CPT | Performed by: NURSE PRACTITIONER

## 2025-06-16 RX ORDER — PIOGLITAZONE 30 MG/1
30 TABLET ORAL DAILY
COMMUNITY
Start: 2025-04-18

## 2025-06-16 RX ORDER — DAPAGLIFLOZIN 5 MG/1
5 TABLET, FILM COATED ORAL DAILY
COMMUNITY
Start: 2025-04-27

## 2025-06-16 ASSESSMENT — ENCOUNTER SYMPTOMS
CONSTIPATION: 0
DIARRHEA: 0
SHORTNESS OF BREATH: 0
ABDOMINAL PAIN: 0

## 2025-06-16 NOTE — PROGRESS NOTES
YEARLY PHYSICAL    Date of service: 2025    Radha Mahajan  Is a 50 y.o. female    PT's PCP is: Trey Huddleston MD     : 1975                                         Chaperone for Intimate Exam  Chaperone was offered as part of the rooming process. Patient declined and agrees to continue with exam without a chaperone.  Chaperone: n/a      Subjective:       Patient's last menstrual period was 02/15/2022 (approximate).     Are your menses regular: not applicable    OB History    Para Term  AB Living   0 0 0 0 0 0   SAB IAB Ectopic Molar Multiple Live Births   0 0 0 0 0 0        Social History     Tobacco Use   Smoking Status Never   Smokeless Tobacco Never        Social History     Substance and Sexual Activity   Alcohol Use Not Currently       Family History   Problem Relation Age of Onset    Diabetes Father     Heart Disease Father     Cancer Sister 33        ovarian       Any family history of breast or ovarian cancer: Yes    Any family history of blood clots: No      Allergies: Statins      Current Outpatient Medications:     pioglitazone (ACTOS) 30 MG tablet, Take 1 tablet by mouth daily, Disp: , Rfl:     FARXIGA 5 MG tablet, Take 1 tablet by mouth daily, Disp: , Rfl:     fluocinonide (LIDEX) 0.05 % cream, APPLY AS DIRECTED TO THE AFFECTED AREA. BRING TO THERAPY AND THERAPIST WILL APPLY AS NEEDED, Disp: , Rfl:     Calcium Carb-Cholecalciferol (CALCIUM 500/D PO), Take by mouth, Disp: , Rfl:     ibuprofen (ADVIL;MOTRIN) 800 MG tablet, Take 1 tablet by mouth every 8 hours as needed for Pain, Disp: 30 tablet, Rfl: 0    fexofenadine (ALLEGRA ALLERGY) 180 MG tablet, Take 180 mg by mouth daily HS, Disp: , Rfl:     albuterol sulfate  (90 Base) MCG/ACT inhaler, , Disp: , Rfl:     ascorbic acid (VITAMIN C) 1000 MG tablet, Take 1,000 mg by mouth daily, Disp: , Rfl:     omeprazole (PRILOSEC) 20 MG delayed release

## 2025-08-31 ENCOUNTER — OFFICE VISIT (OUTPATIENT)
Age: 50
End: 2025-08-31

## 2025-08-31 VITALS
DIASTOLIC BLOOD PRESSURE: 75 MMHG | WEIGHT: 278 LBS | SYSTOLIC BLOOD PRESSURE: 128 MMHG | HEART RATE: 79 BPM | TEMPERATURE: 97.7 F | HEIGHT: 65 IN | OXYGEN SATURATION: 98 % | RESPIRATION RATE: 16 BRPM | BODY MASS INDEX: 46.32 KG/M2

## 2025-08-31 DIAGNOSIS — Z00.00 ROUTINE ADULT HEALTH MAINTENANCE: ICD-10-CM

## 2025-08-31 DIAGNOSIS — N20.0 RENAL CALCULUS, RIGHT: Primary | ICD-10-CM

## 2025-08-31 LAB
BILIRUBIN, URINE, POC: NEGATIVE
BLOOD URINE, POC: ABNORMAL
GLUCOSE URINE, POC: ABNORMAL MG/DL
HCG, URINE, POC: NEGATIVE
KETONES, URINE, POC: NEGATIVE MG/DL
LEUKOCYTE ESTERASE, URINE, POC: NEGATIVE
Lab: NORMAL
NEGATIVE QC PASS/FAIL: NORMAL
NITRITE, URINE, POC: NEGATIVE
PH, URINE, POC: 5.5 (ref 4.6–8)
POSITIVE QC PASS/FAIL: NORMAL
PROTEIN,URINE, POC: NEGATIVE MG/DL
SPECIFIC GRAVITY, URINE, POC: 1.01 (ref 1–1.03)
URINALYSIS CLARITY, POC: ABNORMAL
URINALYSIS COLOR, POC: YELLOW
UROBILINOGEN, POC: ABNORMAL MG/DL

## 2025-08-31 RX ORDER — PHENAZOPYRIDINE HYDROCHLORIDE 200 MG/1
200 TABLET, FILM COATED ORAL 3 TIMES DAILY PRN
Qty: 9 TABLET | Refills: 0 | Status: SHIPPED | OUTPATIENT
Start: 2025-08-31 | End: 2025-09-03

## 2025-08-31 RX ORDER — IBUPROFEN 800 MG/1
800 TABLET, FILM COATED ORAL EVERY 8 HOURS PRN
Qty: 90 TABLET | Refills: 0 | Status: SHIPPED | OUTPATIENT
Start: 2025-08-31 | End: 2025-09-30

## (undated) DEVICE — GOWN,SIRUS,POLYRNF,BRTHSLV,XL,30/CS: Brand: MEDLINE

## (undated) DEVICE — SOLUTION SURG PREP ANTIMICROBIAL 4 OZ SKIN WND EXIDINE

## (undated) DEVICE — TIP COVER ACCESSORY

## (undated) DEVICE — TOTAL TRAY, DB, 100% SILI FOLEY, 16FR 10: Brand: MEDLINE

## (undated) DEVICE — STRIP,CLOSURE,WOUND,MEDI-STRIP,1/2X4: Brand: MEDLINE

## (undated) DEVICE — DRAPE,UTILTY,TAPE,15X26, 4EA/PK: Brand: MEDLINE

## (undated) DEVICE — 20 ML SYRINGE LUER-LOCK TIP: Brand: MONOJECT

## (undated) DEVICE — DRESSING HEMSTAT W1X2IN ABSRB SURGCEL SNOW

## (undated) DEVICE — COVER LT HNDL BLU PLAS

## (undated) DEVICE — SUTURE MCRYL SZ 4-0 L27IN ABSRB UD L19MM PS-2 1/2 CIR PRIM Y426H

## (undated) DEVICE — SUTURE DEV SZ 2-0 WND CLSR ABSRB GS-22 VLOC COVIDIEN VLOCM2145

## (undated) DEVICE — GLOVE SURG SZ 65 L12IN FNGR THK87MIL WHT LTX FREE

## (undated) DEVICE — Z DISCONTINUED APPLICATOR SURG PREP 0.35OZ 2% CHG 70% ISO ALC W/ HI LT

## (undated) DEVICE — Device: Brand: UTERINE ELEVATOR PRO

## (undated) DEVICE — UNDERPANTS INCONT XL 45-70IN KNIT SEAMLESS DSGN COLOR-CODED

## (undated) DEVICE — SYRINGE MED 10ML LUERLOCK TIP W/O SFTY DISP

## (undated) DEVICE — BLADELESS OBTURATOR: Brand: WECK VISTA

## (undated) DEVICE — SOLUTION IV 1000ML 0.9% SOD CHL PH 5 INJ USP VIAFLX PLAS

## (undated) DEVICE — MATERNITY KNIT PANTS,SEAMLESS: Brand: WINGS

## (undated) DEVICE — ELECTRO LUBE IS A SINGLE PATIENT USE DEVICE THAT IS INTENDED TO BE USED ON ELECTROSURGICAL ELECTRODES TO REDUCE STICKING.: Brand: KEY SURGICAL ELECTRO LUBE

## (undated) DEVICE — Z DISCONTINUED BY MEDLINE USE 2711682 TRAY SKIN PREP DRY W/ PREM GLV

## (undated) DEVICE — ELECTRODE ES AD CRD L15FT DISP FOR PT BELOW 30LB REM

## (undated) DEVICE — ARM DRAPE

## (undated) DEVICE — Device

## (undated) DEVICE — SPONGE LAP W18XL18IN WHT COT 4 PLY FLD STRUNG RADPQ DISP ST

## (undated) DEVICE — [HIGH FLOW INSUFFLATOR,  DO NOT USE IF PACKAGE IS DAMAGED,  KEEP DRY,  KEEP AWAY FROM SUNLIGHT,  PROTECT FROM HEAT AND RADIOACTIVE SOURCES.]: Brand: PNEUMOSURE

## (undated) DEVICE — PLUMEPORT LAPAROSCOPIC SMOKE FILTRATION DEVICE: Brand: PLUMEPORT ACTIV

## (undated) DEVICE — SUTURE MCRYL + SZ 4-0 L27IN ABSRB UD L19MM PS-2 3/8 CIR MCP426H

## (undated) DEVICE — GLOVE SURG SZ 65 THK91MIL LTX FREE SYN POLYISOPRENE

## (undated) DEVICE — SOLUTION IV IRRIG POUR BRL 0.9% SODIUM CHL 2F7124

## (undated) DEVICE — LAVH-LF: Brand: MEDLINE INDUSTRIES, INC.

## (undated) DEVICE — CANNULA SEAL

## (undated) DEVICE — COVER,MAYO STAND,STERILE: Brand: MEDLINE

## (undated) DEVICE — MASTISOL ADHESIVE LIQ 2/3ML

## (undated) DEVICE — APPLICATOR MEDICATED 26 CC SOLUTION HI LT ORNG CHLORAPREP

## (undated) DEVICE — 40586 ADVANCED TRENDELENBURG POSITIONING KIT: Brand: 40586 ADVANCED TRENDELENBURG POSITIONING KIT

## (undated) DEVICE — INSUFFLATION NEEDLE TO ESTABLISH PNEUMOPERITONEUM.: Brand: INSUFFLATION NEEDLE

## (undated) DEVICE — WARMER SCP 2 ANTIFOG LAP DISP

## (undated) DEVICE — DRESSING SURESITE-123PLUSPAD 2.4 IN X2.8 IN